# Patient Record
Sex: MALE | Race: BLACK OR AFRICAN AMERICAN | NOT HISPANIC OR LATINO | Employment: OTHER | URBAN - METROPOLITAN AREA
[De-identification: names, ages, dates, MRNs, and addresses within clinical notes are randomized per-mention and may not be internally consistent; named-entity substitution may affect disease eponyms.]

---

## 2017-01-03 ENCOUNTER — COMMUNICATION - HEALTHEAST (OUTPATIENT)
Dept: FAMILY MEDICINE | Facility: CLINIC | Age: 37
End: 2017-01-03

## 2017-01-27 ENCOUNTER — OFFICE VISIT - HEALTHEAST (OUTPATIENT)
Dept: FAMILY MEDICINE | Facility: CLINIC | Age: 37
End: 2017-01-27

## 2017-01-27 ENCOUNTER — HOSPITAL ENCOUNTER (OUTPATIENT)
Dept: CT IMAGING | Facility: HOSPITAL | Age: 37
Discharge: HOME | End: 2017-01-27

## 2017-01-27 ENCOUNTER — COMMUNICATION - HEALTHEAST (OUTPATIENT)
Dept: SCHEDULING | Facility: CLINIC | Age: 37
End: 2017-01-27

## 2017-01-27 DIAGNOSIS — K76.0 HEPATIC STEATOSIS: ICD-10-CM

## 2017-01-27 DIAGNOSIS — N20.0 KIDNEY STONE: ICD-10-CM

## 2017-01-27 DIAGNOSIS — R10.9 ACUTE RIGHT FLANK PAIN: ICD-10-CM

## 2017-02-16 ENCOUNTER — COMMUNICATION - HEALTHEAST (OUTPATIENT)
Dept: FAMILY MEDICINE | Facility: CLINIC | Age: 37
End: 2017-02-16

## 2017-02-16 DIAGNOSIS — E11.9 DM (DIABETES MELLITUS) (H): ICD-10-CM

## 2017-07-05 ENCOUNTER — OFFICE VISIT - HEALTHEAST (OUTPATIENT)
Dept: FAMILY MEDICINE | Facility: CLINIC | Age: 37
End: 2017-07-05

## 2017-07-05 DIAGNOSIS — L02.91 ABSCESS: ICD-10-CM

## 2017-07-05 RX ORDER — ALCOHOL ANTISEPTIC PADS
PADS, MEDICATED (EA) TOPICAL
Status: SHIPPED | COMMUNITY
Start: 2017-03-06 | End: 2022-12-01

## 2017-07-05 ASSESSMENT — MIFFLIN-ST. JEOR: SCORE: 1677.17

## 2018-04-02 ENCOUNTER — RECORDS - HEALTHEAST (OUTPATIENT)
Dept: ADMINISTRATIVE | Facility: OTHER | Age: 38
End: 2018-04-02

## 2018-04-06 ENCOUNTER — OFFICE VISIT - HEALTHEAST (OUTPATIENT)
Dept: FAMILY MEDICINE | Facility: CLINIC | Age: 38
End: 2018-04-06

## 2018-04-06 DIAGNOSIS — N52.9 ED (ERECTILE DYSFUNCTION): ICD-10-CM

## 2018-04-06 DIAGNOSIS — E11.9 TYPE 2 DIABETES MELLITUS WITHOUT COMPLICATION (H): ICD-10-CM

## 2018-04-06 DIAGNOSIS — R93.7 ABNORMAL MRI, LUMBAR SPINE: ICD-10-CM

## 2018-04-06 DIAGNOSIS — Z00.00 ROUTINE GENERAL MEDICAL EXAMINATION AT A HEALTH CARE FACILITY: ICD-10-CM

## 2018-04-06 DIAGNOSIS — I10 HYPERTENSION: ICD-10-CM

## 2018-04-06 LAB
ALBUMIN SERPL-MCNC: 3.7 G/DL (ref 3.5–5)
ALBUMIN UR-MCNC: NEGATIVE MG/DL
ALP SERPL-CCNC: 83 U/L (ref 45–120)
ALT SERPL W P-5'-P-CCNC: 41 U/L (ref 0–45)
ANION GAP SERPL CALCULATED.3IONS-SCNC: 9 MMOL/L (ref 5–18)
APPEARANCE UR: CLEAR
AST SERPL W P-5'-P-CCNC: 25 U/L (ref 0–40)
BASOPHILS # BLD AUTO: 0 THOU/UL (ref 0–0.2)
BASOPHILS NFR BLD AUTO: 1 % (ref 0–2)
BILIRUB SERPL-MCNC: 0.4 MG/DL (ref 0–1)
BILIRUB UR QL STRIP: NEGATIVE
BUN SERPL-MCNC: 9 MG/DL (ref 8–22)
CALCIUM SERPL-MCNC: 9.9 MG/DL (ref 8.5–10.5)
CHLORIDE BLD-SCNC: 104 MMOL/L (ref 98–107)
CHOLEST SERPL-MCNC: 173 MG/DL
CO2 SERPL-SCNC: 27 MMOL/L (ref 22–31)
COLOR UR AUTO: YELLOW
CREAT SERPL-MCNC: 0.76 MG/DL (ref 0.7–1.3)
EOSINOPHIL # BLD AUTO: 0.1 THOU/UL (ref 0–0.4)
EOSINOPHIL NFR BLD AUTO: 2 % (ref 0–6)
ERYTHROCYTE [DISTWIDTH] IN BLOOD BY AUTOMATED COUNT: 13 % (ref 11–14.5)
FASTING STATUS PATIENT QL REPORTED: YES
GFR SERPL CREATININE-BSD FRML MDRD: >60 ML/MIN/1.73M2
GLUCOSE BLD-MCNC: 236 MG/DL (ref 70–125)
GLUCOSE UR STRIP-MCNC: NEGATIVE MG/DL
HBA1C MFR BLD: 11.5 % (ref 3.5–6)
HCT VFR BLD AUTO: 42.7 % (ref 40–54)
HDLC SERPL-MCNC: 36 MG/DL
HGB BLD-MCNC: 14.4 G/DL (ref 14–18)
HGB UR QL STRIP: NEGATIVE
KETONES UR STRIP-MCNC: NEGATIVE MG/DL
LDLC SERPL CALC-MCNC: 114 MG/DL
LEUKOCYTE ESTERASE UR QL STRIP: NEGATIVE
LYMPHOCYTES # BLD AUTO: 2.7 THOU/UL (ref 0.8–4.4)
LYMPHOCYTES NFR BLD AUTO: 37 % (ref 20–40)
MCH RBC QN AUTO: 28.2 PG (ref 27–34)
MCHC RBC AUTO-ENTMCNC: 33.8 G/DL (ref 32–36)
MCV RBC AUTO: 83 FL (ref 80–100)
MONOCYTES # BLD AUTO: 0.6 THOU/UL (ref 0–0.9)
MONOCYTES NFR BLD AUTO: 8 % (ref 2–10)
NEUTROPHILS # BLD AUTO: 3.8 THOU/UL (ref 2–7.7)
NEUTROPHILS NFR BLD AUTO: 53 % (ref 50–70)
NITRATE UR QL: NEGATIVE
PH UR STRIP: 6 [PH] (ref 5–8)
PLATELET # BLD AUTO: 312 THOU/UL (ref 140–440)
PMV BLD AUTO: 7.4 FL (ref 7–10)
POTASSIUM BLD-SCNC: 4.5 MMOL/L (ref 3.5–5)
PROT SERPL-MCNC: 7.4 G/DL (ref 6–8)
RBC # BLD AUTO: 5.12 MILL/UL (ref 4.4–6.2)
SODIUM SERPL-SCNC: 140 MMOL/L (ref 136–145)
SP GR UR STRIP: 1.02 (ref 1–1.03)
TRIGL SERPL-MCNC: 116 MG/DL
TSH SERPL DL<=0.005 MIU/L-ACNC: 1.31 UIU/ML (ref 0.3–5)
UROBILINOGEN UR STRIP-ACNC: NORMAL
WBC: 7.2 THOU/UL (ref 4–11)

## 2018-04-06 ASSESSMENT — MIFFLIN-ST. JEOR: SCORE: 1657.89

## 2018-04-08 ENCOUNTER — COMMUNICATION - HEALTHEAST (OUTPATIENT)
Dept: FAMILY MEDICINE | Facility: CLINIC | Age: 38
End: 2018-04-08

## 2018-05-21 ENCOUNTER — OFFICE VISIT - HEALTHEAST (OUTPATIENT)
Dept: FAMILY MEDICINE | Facility: CLINIC | Age: 38
End: 2018-05-21

## 2018-05-21 DIAGNOSIS — I10 HYPERTENSION: ICD-10-CM

## 2018-05-21 DIAGNOSIS — N52.9 ED (ERECTILE DYSFUNCTION): ICD-10-CM

## 2018-05-21 ASSESSMENT — MIFFLIN-ST. JEOR: SCORE: 1671.5

## 2018-11-01 ENCOUNTER — OFFICE VISIT - HEALTHEAST (OUTPATIENT)
Dept: FAMILY MEDICINE | Facility: CLINIC | Age: 38
End: 2018-11-01

## 2018-11-01 DIAGNOSIS — R79.89 LOW TESTOSTERONE IN MALE: ICD-10-CM

## 2018-11-01 DIAGNOSIS — F52.0 DECREASED SEXUAL DESIRE: ICD-10-CM

## 2018-11-01 DIAGNOSIS — Z11.3 SCREEN FOR STD (SEXUALLY TRANSMITTED DISEASE): ICD-10-CM

## 2018-11-01 DIAGNOSIS — G44.209 TENSION HEADACHE: ICD-10-CM

## 2018-11-01 DIAGNOSIS — I10 ESSENTIAL HYPERTENSION: ICD-10-CM

## 2018-11-01 DIAGNOSIS — E11.9 TYPE 2 DIABETES MELLITUS WITHOUT COMPLICATION, WITHOUT LONG-TERM CURRENT USE OF INSULIN (H): ICD-10-CM

## 2018-11-01 DIAGNOSIS — E55.9 VITAMIN D DEFICIENCY: ICD-10-CM

## 2018-11-01 LAB
ANION GAP SERPL CALCULATED.3IONS-SCNC: 13 MMOL/L (ref 5–18)
BUN SERPL-MCNC: 12 MG/DL (ref 8–22)
CALCIUM SERPL-MCNC: 9.6 MG/DL (ref 8.5–10.5)
CHLORIDE BLD-SCNC: 104 MMOL/L (ref 98–107)
CO2 SERPL-SCNC: 22 MMOL/L (ref 22–31)
CREAT SERPL-MCNC: 0.6 MG/DL (ref 0.7–1.3)
GFR SERPL CREATININE-BSD FRML MDRD: >60 ML/MIN/1.73M2
GLUCOSE BLD-MCNC: 72 MG/DL (ref 70–125)
HBA1C MFR BLD: 7.5 % (ref 3.5–6)
HIV 1+2 AB+HIV1 P24 AG SERPL QL IA: NEGATIVE
POTASSIUM BLD-SCNC: 4.1 MMOL/L (ref 3.5–5)
SODIUM SERPL-SCNC: 139 MMOL/L (ref 136–145)

## 2018-11-02 LAB
25(OH)D3 SERPL-MCNC: 19.6 NG/ML (ref 30–80)
T PALLIDUM AB SER QL: NEGATIVE

## 2018-11-05 ENCOUNTER — COMMUNICATION - HEALTHEAST (OUTPATIENT)
Dept: FAMILY MEDICINE | Facility: CLINIC | Age: 38
End: 2018-11-05

## 2018-11-07 ENCOUNTER — COMMUNICATION - HEALTHEAST (OUTPATIENT)
Dept: FAMILY MEDICINE | Facility: CLINIC | Age: 38
End: 2018-11-07

## 2018-11-07 LAB
SHBG SERPL-SCNC: 13 NMOL/L (ref 11–80)
TESTOST FREE SERPL-MCNC: 6.05 NG/DL (ref 4.7–24.4)
TESTOST SERPL-MCNC: 207 NG/DL (ref 240–950)

## 2018-11-08 ENCOUNTER — COMMUNICATION - HEALTHEAST (OUTPATIENT)
Dept: FAMILY MEDICINE | Facility: CLINIC | Age: 38
End: 2018-11-08

## 2018-12-26 ENCOUNTER — AMBULATORY - HEALTHEAST (OUTPATIENT)
Dept: LAB | Facility: HOSPITAL | Age: 38
End: 2018-12-26

## 2018-12-26 DIAGNOSIS — Z31.41 FERTILITY TESTING: ICD-10-CM

## 2018-12-27 LAB
ANNOTATION COMMENT IMP: NORMAL
CHARACTER SMN: NORMAL
IMM GERM CELLS # SMN: 1.56 X10(6)
PATIENT LOCATION: NORMAL
PH SMN: 8 [PH]
SEX ABSTIN DURATION TIME PATIENT: 6 D
SMN ANALYSIS METHOD: NORMAL
SPEC CONTAINER SPEC: NORMAL
SPECIMEN VOL SMN: 2.5 ML
SPERM # SMN: 39.1 X10(6)
SPERM ABNORM HEAD SHAPE NFR SMN: 19.5 %
SPERM ABNORM HEAD SIZE NFR SMN: 0 %
SPERM ABNORM MIDPIECE NFR SMN: 8.5 %
SPERM ABNORM TAIL NFR SMN: 39.5 %
SPERM ACROSOME DEFECTS NFR SMN: 21.5 %
SPERM AGGLUTINATED SMN QL: 4
SPERM DOUBLE FORMS NFR SMN: 6 %
SPERM MOTILE # SMN: 16.4 X10(6)
SPERM MOTILE NFR SMN: 41 X10(6)
SPERM MOTILE NFR SMN: 42 %
SPERM MOTILE SMN QL MICRO: 2.5
SPERM MULTIPLE DEFECTS NFR SMN: 0 %
SPERM NORM NFR SMN: 5 %
VISC SMN QL: 4
WBC # SMN: 0 X10(6)

## 2019-02-25 ENCOUNTER — RECORDS - HEALTHEAST (OUTPATIENT)
Dept: ADMINISTRATIVE | Facility: OTHER | Age: 39
End: 2019-02-25

## 2019-03-14 ENCOUNTER — RECORDS - HEALTHEAST (OUTPATIENT)
Dept: HEALTH INFORMATION MANAGEMENT | Facility: CLINIC | Age: 39
End: 2019-03-14

## 2019-05-15 ENCOUNTER — OFFICE VISIT - HEALTHEAST (OUTPATIENT)
Dept: FAMILY MEDICINE | Facility: CLINIC | Age: 39
End: 2019-05-15

## 2019-05-15 DIAGNOSIS — I10 HYPERTENSION: ICD-10-CM

## 2019-05-15 DIAGNOSIS — E11.9 TYPE 2 DIABETES MELLITUS WITHOUT COMPLICATION (H): ICD-10-CM

## 2019-05-15 DIAGNOSIS — G44.219 EPISODIC TENSION-TYPE HEADACHE, NOT INTRACTABLE: ICD-10-CM

## 2019-05-15 LAB
CREAT UR-MCNC: 196.6 MG/DL
HBA1C MFR BLD: 8.2 % (ref 3.5–6)
MICROALBUMIN UR-MCNC: 0.79 MG/DL (ref 0–1.99)
MICROALBUMIN/CREAT UR: 4 MG/G

## 2019-07-16 ENCOUNTER — OFFICE VISIT - HEALTHEAST (OUTPATIENT)
Dept: FAMILY MEDICINE | Facility: CLINIC | Age: 39
End: 2019-07-16

## 2019-07-16 DIAGNOSIS — E11.9 TYPE 2 DIABETES MELLITUS WITHOUT COMPLICATION (H): ICD-10-CM

## 2019-07-16 DIAGNOSIS — I10 HYPERTENSION: ICD-10-CM

## 2019-07-16 RX ORDER — EMPAGLIFLOZIN 10 MG/1
1 TABLET, FILM COATED ORAL DAILY
Qty: 90 TABLET | Refills: 3 | Status: SHIPPED | OUTPATIENT
Start: 2019-07-16 | End: 2021-10-25

## 2019-07-16 RX ORDER — METFORMIN HCL 500 MG
1000 TABLET, EXTENDED RELEASE 24 HR ORAL DAILY
Qty: 180 TABLET | Refills: 3 | Status: SHIPPED | OUTPATIENT
Start: 2019-07-16 | End: 2021-10-25

## 2019-07-16 RX ORDER — ATORVASTATIN CALCIUM 20 MG/1
20 TABLET, FILM COATED ORAL DAILY
Qty: 90 TABLET | Refills: 3 | Status: SHIPPED | OUTPATIENT
Start: 2019-07-16 | End: 2021-10-25

## 2019-07-16 RX ORDER — LISINOPRIL 20 MG/1
20 TABLET ORAL DAILY
Qty: 90 TABLET | Refills: 3 | Status: SHIPPED | OUTPATIENT
Start: 2019-07-16 | End: 2022-11-21

## 2019-07-16 RX ORDER — GLYBURIDE 5 MG/1
TABLET ORAL
Qty: 180 TABLET | Refills: 3 | Status: SHIPPED | OUTPATIENT
Start: 2019-07-16 | End: 2021-10-25

## 2021-05-28 NOTE — PROGRESS NOTES
Assessment:     1. Episodic tension-type headache, not intractable     2. Hypertension  atorvastatin (LIPITOR) 20 MG tablet    lisinopril (PRINIVIL,ZESTRIL) 20 MG tablet   3. Type 2 diabetes mellitus without complication (H)  JARDIANCE 10 mg Tab    metFORMIN (GLUCOPHAGE-XR) 500 MG 24 hr tablet    glyBURIDE (DIABETA) 5 MG tablet    Glycosylated Hemoglobin A1c    Microalbumin, Random Urine       Plan:     1. Hypertension  Patient's blood pressure is under good control; we had a long discussion about an exercise program which the patient really does need he promises to keep an exercise diary for me for 2 months return at that time and will reevaluate and also recheck his blood parameters  - atorvastatin (LIPITOR) 20 MG tablet; Take 1 tablet (20 mg total) by mouth daily.  Dispense: 30 tablet; Refill: 11  - lisinopril (PRINIVIL,ZESTRIL) 20 MG tablet; Take 1 tablet (20 mg total) by mouth daily.  Dispense: 30 tablet; Refill: 11    2. Type 2 diabetes mellitus without complication (H)  Patient does not exercise at all does not walk; we advised him he needs to walk 30 to 40 minutes at least 5 times per week we outlined a plan he is going to keep a diary and come in and see me here in 2 months again it is summertime and there is no excuse for him not to come home he has the time [place] an area [place] very walk [place] and I would like to see him lose 10 to 15 pounds so we may consider cutting back on some of his medications  - JARDIANCE 10 mg Tab; Take 1 tablet (10 mg total) by mouth daily.  Dispense: 30 tablet; Refill: 11  - metFORMIN (GLUCOPHAGE-XR) 500 MG 24 hr tablet; Take 2 tablets (1,000 mg total) by mouth daily.  Dispense: 60 tablet; Refill: 11  - glyBURIDE (DIABETA) 5 MG tablet; TAKE 1 TABLET (5 MG TOTAL) BY MOUTH 2 (TWO) TIMES A DAY WITH MEALS.  Dispense: 60 tablet; Refill: 11  - Glycosylated Hemoglobin A1c  - Microalbumin, Random Urine    3. Episodic tension-type headache, not intractable  Patient's headache  pattern is that of stress related type headaches.  He works hard he and his wife are trying to get pregnant again.  We did allow him to ventilate he really needs aerobic exercise and he can bring his wife along with him as I feel she is under a lot of stress also.  These will make his blood sugars better his weight better his overall pill load may be able to be cut down if we can get this program going.  We did spend a considerable amount of time at today's visit discussing health hygiene.      Subjective:   I am happy to see this pleasant 39-year-old again who comes in for his diabetes and hyper tension check and medication check.  Unfortunately he is suffering again from headaches which he says are on the top of his head and burn and also some left hip and leg discomfort.  He has no constitutional complaints vision is okay no cranial nerve dysfunction shortness of breath dyspnea chest pain hemoptysis no change in bowels no signs of gastroparesis no neurological complaints no paresthesias.  Please see the above outlined in his plan patient desperately needs exercise program to go on 52 weeks a year to have an overall goal here of getting weight off and cut down on pill load for long-term good health.  Patient agrees with this.  We are not to change any of his medications today unless his blood parameters demand that once we get them back his overall situation to me shows a fair amount of stress job-related as well as some fertility issues which we have discussed at other times.  Hopefully he will keep this diarrhea that we have suggested will come back in 2 months we will see how he is doing I want to reweigh him and go over all of his tests again we did    Review of Systems: A complete 14 point review of systems was obtained and is negative or as stated in the history of present illness.    Past Medical History:   Diagnosis Date     Diabetes mellitus (H)      Essential hypertension 11/1/2018     Family History    Problem Relation Age of Onset     Heart attack Father      No past surgical history on file.  Social History     Tobacco Use     Smoking status: Never Smoker     Smokeless tobacco: Never Used   Substance Use Topics     Alcohol use: No     Drug use: No         Objective:   /70   Pulse 80   Wt 182 lb 14.4 oz (83 kg)   BMI 30.44 kg/m      General Appearance:  Alert, cooperative, no distress  Head:  Normocephalic, no obvious abnormality  Ears: TM anatomy normal  Eyes:  PERRL, EOM's intact, conjunctiva and corneas clear  Nose:  Nares symmetrical, septum midline, mucosa pink, no sinus tenderness  Throat:  Lips, tongue, and mucosa are moist, pink, and intact  Neck:  Supple, symmetrical, trachea midline, no adenopathy; thyroid: no enlargement, symmetric,no tenderness/mass/nodules; no carotid bruit, no JVD  Back:  Symmetrical, no curvature, ROM normal, no CVA tenderness  Chest/Breast:  No mass or tenderness  Lungs:  Clear to auscultation bilaterally, respirations unlabored   Heart:  Normal PMI, regular rate & rhythm, S1 and S2 normal, no murmurs, rubs, or gallops  Abdomen:  Soft, non-tender, bowel sounds active all four quadrants, no mass, or organomegaly  Musculoskeletal:  Tone and strength strong and symmetrical, all extremities  Lymphatic:  No adenopathy  Skin/Hair/Nails:  Skin warm, dry, and intact, no rashes  Neurologic:  Alert and oriented x3, no cranial nerve deficits, normal strength and tone, gait steady  Extremities:  No edema.  Abiel's sign negative.    Genitourinary: deferred  Pulses:  Equal bilaterally     The following high BMI interventions were performed this visit: encouragement to exercise      This note has been dictated using voice recognition software. Any grammatical or context distortions are unintentional and inherent to the the software.

## 2021-05-30 VITALS — BODY MASS INDEX: 32.67 KG/M2 | WEIGHT: 193.3 LBS

## 2021-05-30 NOTE — PROGRESS NOTES
Assessment:     1. Hypertension  atorvastatin (LIPITOR) 20 MG tablet    lisinopril (PRINIVIL,ZESTRIL) 20 MG tablet   2. Type 2 diabetes mellitus without complication (H)  glyBURIDE (DIABETA) 5 MG tablet    JARDIANCE 10 mg Tab    metFORMIN (GLUCOPHAGE-XR) 500 MG 24 hr tablet       Plan:     1. Hypertension  No change in medications; patient is losing his insurance; plans on traveling to [place] where he may obtain tablets we discussed this hopefully he can get to ensure some other form of public assistance we will see him for follow-up within 6 months to repeat his A1c and his urine microalbumin  - atorvastatin (LIPITOR) 20 MG tablet; Take 1 tablet (20 mg total) by mouth daily.  Dispense: 90 tablet; Refill: 3  - lisinopril (PRINIVIL,ZESTRIL) 20 MG tablet; Take 1 tablet (20 mg total) by mouth daily.  Dispense: 90 tablet; Refill: 3    2. Type 2 diabetes mellitus without complication (H)  No change in medication hopefully you can obtain the following tablets  - glyBURIDE (DIABETA) 5 MG tablet; TAKE 1 TABLET (5 MG TOTAL) BY MOUTH 2 (TWO) TIMES A DAY WITH MEALS.  Dispense: 180 tablet; Refill: 3  - JARDIANCE 10 mg Tab; Take 1 tablet (10 mg total) by mouth daily.  Dispense: 90 tablet; Refill: 3  - metFORMIN (GLUCOPHAGE-XR) 500 MG 24 hr tablet; Take 2 tablets (1,000 mg total) by mouth daily.  Dispense: 180 tablet; Refill: 3      Subjective:   I am seeing this patient for follow-up.  Unfortunately he is losing his insurance coverage.  His last A1c was 8.2.  We did increase his aerobic exercise discussed the importance of walking and losing some weight.  He is struggling with this.  Unfortunately received news his mother is very ill and may have to go back to [place].  May have to take some time off from his job.'s a vicious cycle for this young man.  He states he may be able to obtain his tablets for his diabetes and hypertension while in [place] specifically [place] and we wish him well.  He is looking at other forms of  healthcare coverage perhaps men sure.  We wish him well his exam today is unchanged he seems to be under some stress and I certainly can understand that hopefully things will resolve for him.  He denies any visual changes hearing loss dysphasia shortness of breath dyspnea chest pain angina abdominal pain diarrhea constipation urgency frequency dysuria follow-up as outlined.    Review of Systems: A complete 14 point review of systems was obtained and is negative or as stated in the history of present illness.    Past Medical History:   Diagnosis Date     Diabetes mellitus (H)      Essential hypertension 11/1/2018     Family History   Problem Relation Age of Onset     Heart attack Father      No past surgical history on file.  Social History     Tobacco Use     Smoking status: Never Smoker     Smokeless tobacco: Never Used   Substance Use Topics     Alcohol use: No     Drug use: No         Objective:   /70   Pulse 80   Wt 184 lb 8 oz (83.7 kg)   BMI 30.70 kg/m      General Appearance:  Alert, cooperative, no distress  Head:  Normocephalic, no obvious abnormality  Ears: TM anatomy normal  Eyes:  PERRL, EOM's intact, conjunctiva and corneas clear  Nose:  Nares symmetrical, septum midline, mucosa pink, no sinus tenderness  Throat:  Lips, tongue, and mucosa are moist, pink, and intact  Neck:  Supple, symmetrical, trachea midline, no adenopathy; thyroid: no enlargement, symmetric,no tenderness/mass/nodules; no carotid bruit, no JVD  Back:  Symmetrical, no curvature, ROM normal, no CVA tenderness  Chest/Breast:  No mass or tenderness  Lungs:  Clear to auscultation bilaterally, respirations unlabored   Heart:  Normal PMI, regular rate & rhythm, S1 and S2 normal, no murmurs, rubs, or gallops  Abdomen:  Soft, non-tender, bowel sounds active all four quadrants, no mass, or organomegaly  Musculoskeletal:  Tone and strength strong and symmetrical, all extremities  Lymphatic:  No adenopathy  Skin/Hair/Nails:  Skin warm,  dry, and intact, no rashes  Neurologic:  Alert and oriented x3, no cranial nerve deficits, normal strength and tone, gait steady  Extremities:  No edema.  Abiel's sign negative.    Genitourinary: deferred  Pulses:  Equal bilaterally     The following high BMI interventions were performed this visit: encouragement to exercise      This note has been dictated using voice recognition software. Any grammatical or context distortions are unintentional and inherent to the the software.

## 2021-05-31 VITALS — HEIGHT: 65 IN | BODY MASS INDEX: 31.16 KG/M2 | WEIGHT: 187 LBS

## 2021-06-01 VITALS — HEIGHT: 65 IN | WEIGHT: 181 LBS | BODY MASS INDEX: 30.16 KG/M2

## 2021-06-01 VITALS — BODY MASS INDEX: 32.95 KG/M2 | WEIGHT: 195 LBS | HEIGHT: 65 IN | BODY MASS INDEX: 32.45 KG/M2

## 2021-06-01 VITALS — BODY MASS INDEX: 30.66 KG/M2 | HEIGHT: 65 IN | WEIGHT: 184 LBS

## 2021-06-02 VITALS — WEIGHT: 177.06 LBS | BODY MASS INDEX: 29.46 KG/M2

## 2021-06-03 VITALS — WEIGHT: 184.5 LBS | BODY MASS INDEX: 30.7 KG/M2

## 2021-06-03 VITALS — BODY MASS INDEX: 30.44 KG/M2 | WEIGHT: 182.9 LBS

## 2021-06-08 NOTE — PROGRESS NOTES
Walk-In Clinic Note    SUBJECTIVE:   Heidi Boyce is a 37 y.o. male who presents today for evaluation of back pain. He had acute onset of right flank pain at noon today while at work eating lunch.  He notes the pain is squeezing in nature and calms and goes.  He was in the lobby of the waiting room on his knees and pain, but now states he feels better.  He denies any blood in his urine or pain with urination.  He had no injury.  He has not tried any medications to help his pain thus far.  She has no symptoms on the left side of his back, nor down his legs.  He has never had a kidney stone before, though his bath had one 1 week ago and was concerned that was what was going on so told him to come in to be evaluated.  He denies any fevers.  No nausea or vomiting.  No diarrhea.    Patient Active Problem List   Diagnosis     Obesity     Vitamin D Deficiency     Abdominal Pain     Type 2 diabetes mellitus without complication     Abnormal MRI, lumbar spine       History   Smoking Status     Never Smoker   Smokeless Tobacco     Never Used       Current Medications:  Current Outpatient Prescriptions on File Prior to Visit   Medication Sig Dispense Refill     glyBURIDE (DIABETA) 5 MG tablet TAKE 1 TABLET (5 MG TOTAL) BY MOUTH 2 (TWO) TIMES A DAY WITH MEALS. 180 tablet 3     metFORMIN (GLUCOPHAGE-XR) 500 MG 24 hr tablet TAKE TWO TABLETS BY MOUTH DAILY 180 tablet 3     No current facility-administered medications on file prior to visit.        Allergies:   No Known Allergies    OBJECTIVE:   Vitals:    01/27/17 1617   BP: 130/70   Pulse: 80   Resp: 15   Temp: 97.7  F (36.5  C)   TempSrc: Oral   SpO2: 99%   Weight: 193 lb 4.8 oz (87.7 kg)      General: Alert and in no acute distress.  Appears to be uncomfortable.    Pulmonary: Clear to ausculation throughout with good air movement, no crackles or wheezing.  Cardiac: Regular rate and rhythm, no murmur  Abdomen: Active bowel sounds. Soft, non-tender. No peritoneal signs.  Right  sided CVA tenderness, no pain with palpation of low back or paraspinal muscles.   Musculoskeletal: No deformities. Ambulatory with movement of all extremities.  Skin: Color is normal. No rashes or abnormal lesions.    ASSESSMENT AND PLAN:   1. Acute right flank pain  Urinalysis-UC if Indicated     Concerning for kidney stones base on history and physical exam findings. No symptoms to suggest infection. UA obtained with blood. Patient sent to [facility] for non-contrast CT. Received call from the radiologist, patient does have a 2 mm stone at the right UVJ junction.  Incidental finding of severe hepatic stenosis also noted.  Findings were discussed with patient over the phone.  He was discharged home with ibuprofen 400 mg to use every 6 hours, Dramamine 50 mg to use nightly as well as every 6 hours for pain, as well as Norco to use for severe breakthrough pain.  We reviewed that if he has severe uncontrolled pain, vomiting, or fevers over the weekend, he should be seen immediately in the emergency room.  I did recommend that he strain his urine.  Rate of passage is high for the size stone.  He will follow-up with his primary care provider next week.    Sandy Ghosh MD

## 2021-06-11 NOTE — PROGRESS NOTES
Assessment/ Plan  1.  Skin abscess  Right hip, recurrent  Suspect underlying sebaceous cyst or other underlying structural abnormality    Patient had allergic reaction with amoxicillin.  Will use trimethoprim sulfa for 10 days.  Recommend follow-up with primary care doctor or myself in the future and consideration of excision of underlying structural abnormality/sebaceous cyst  - Culture, Wound    Body mass index is 31.6 kg/(m^2).    Subjective  CC:  Chief Complaint   Patient presents with     Skin Problem     right side of hip     HPI:  2-3 days of pain and drainage right hip.  Sudden onset.  Patient claims this is the third recurrence in the same spot, happens every 2 or 3 years  Moderate pain, significant drainage.  No fever or chills and denies any increase in his blood sugars with this infection.    States that there is a hard lump underneath the skin between these episodes.      PFSH:  Current medications reviewed as follows:  Current Outpatient Prescriptions on File Prior to Visit   Medication Sig     glyBURIDE (DIABETA) 5 MG tablet TAKE 1 TABLET (5 MG TOTAL) BY MOUTH 2 (TWO) TIMES A DAY WITH MEALS.     metFORMIN (GLUCOPHAGE-XR) 500 MG 24 hr tablet TAKE TWO TABLETS BY MOUTH DAILY     ibuprofen (ADVIL,MOTRIN) 400 MG tablet Take 1 tablet (400 mg total) by mouth every 6 (six) hours.     No current facility-administered medications on file prior to visit.      Patient Active Problem List    Diagnosis Date Noted     Abnormal MRI, lumbar spine 11/12/2015     Obesity      Overview Note:     Created by Conversion         Vitamin D Deficiency      Overview Note:     Created by Conversion    Replacement Utility updated for latest IMO load       Abdominal Pain      Overview Note:     Created by Conversion    Replacement Utility updated for latest IMO load       Type 2 diabetes mellitus without complication      Overview Note:     Created by Conversion         History   Smoking Status     Never Smoker   Smokeless Tobacco  "    Never Used     Social History     Social History Narrative     Patient Care Team:  Krishna Jasso MD as PCP - General (Family Medicine)  ROS  Negative constitutional.  Positive respiratory for slight cough which followed URI 3 weeks ago.  This is gradually getting better.   Negative skin  Objective  Physical Exam  Vitals:    07/05/17 0811   BP: 118/72   Pulse: 86   Resp: 20   Temp: 98  F (36.7  C)   TempSrc: Oral   SpO2: 98%   Weight: 187 lb (84.8 kg)   Height: 5' 4.5\" (1.638 m)     Overweight 37-year-old male, no distress.  Dressing right hip with serosanguinous discharge starting to soak through.  Ulcer with raised edges, minimal redness and copious discharge, very thin and watery.  This was cultured.  No surrounding induration.  Wound gently squeezed and only small amount of extra discharge found so I did not feel this warranted exploration  Diagnostics  None    Please note: Voice recognition software was used in this dictation.  It may therefore contain typographical errors.    "

## 2021-06-16 PROBLEM — E11.69 ERECTILE DYSFUNCTION ASSOCIATED WITH TYPE 2 DIABETES MELLITUS (H): Status: ACTIVE | Noted: 2017-03-06

## 2021-06-16 PROBLEM — I10 ESSENTIAL HYPERTENSION: Status: ACTIVE | Noted: 2018-11-01

## 2021-06-16 PROBLEM — N52.1 ERECTILE DYSFUNCTION ASSOCIATED WITH TYPE 2 DIABETES MELLITUS (H): Status: ACTIVE | Noted: 2017-03-06

## 2021-06-16 PROBLEM — E29.1 HYPOGONADISM IN MALE: Status: ACTIVE | Noted: 2017-03-08

## 2021-06-16 PROBLEM — K76.0 FATTY LIVER: Status: ACTIVE | Noted: 2017-03-06

## 2021-06-16 PROBLEM — N20.0 KIDNEY STONE: Status: ACTIVE | Noted: 2018-11-01

## 2021-06-17 NOTE — PROGRESS NOTES
"CC: I am having trouble with my erections; my blood pressure is up; I have headaches    HPI: Patient is under my care for a few years now patient has been having trouble with his diabetes blood sugars have been elevated he follows with Dr. Foster of [facility].  Patient is on metformin and glyburide thousand milligrams twice daily glyburide 5 mg daily patient has noticed she has had some headaches and some slight chest twinges in the last year or so but no shortness of breath dyspnea or abdominal pain is having difficulty getting an erection in the last 3-4 weeks.  Patient is also interested in getting his wife pregnant again she has a problem with blocked tubes he is requesting referral for infertility which we will investigate form.  Patient does not exercise on a regular basis we stressed the importance of regular aerobic exercise.  Patient's main problem is overeating he does not drink alcohol or smoke or use drugs and never has.  Patient's blood pressure is been found to be high at other medical settings in the last year or so.  Today patient's blood pressure elevated medical decision-making is to start the patient on a blood pressure medication today in follow-up in 1 month's time all medical questions that were asked were answered I personally reviewed family and social history surgeries allergies and problems list.      Review of Systems: A complete 14 point review of systems was obtained and is negative or as stated in the history of present illness.    Past Medical History:   Diagnosis Date     Diabetes mellitus      Family History   Problem Relation Age of Onset     Heart attack Father      No past surgical history on file.  Social History   Substance Use Topics     Smoking status: Never Smoker     Smokeless tobacco: Never Used     Alcohol use No       PE:   BP (!) 154/98  Pulse 74  Ht 5' 5\" (1.651 m)  Wt 181 lb (82.1 kg)  SpO2 99%  BMI 30.12 kg/m2     General Appearance:  Alert, cooperative, " no distress; overweight body habitus  Head:  Normocephalic, no obvious abnormality  Ears: TM anatomy normal  Eyes:  PERRL, EOM's intact, conjunctiva and corneas clear  Nose:  Nares symmetrical, septum midline, mucosa pink, no sinus tenderness  Throat:  Lips, tongue, and mucosa are moist, pink, and intact  Neck:  Supple, symmetrical, trachea midline, no adenopathy; thyroid: no enlargement, symmetric,no tenderness/mass/nodules; no carotid bruit, no JVD  Back:  Symmetrical, no curvature, ROM normal, no CVA tenderness  Chest/Breast:  No mass or tenderness  Lungs:  Clear to auscultation bilaterally, respirations unlabored   Heart:  Normal PMI, regular rate & rhythm, S1 and S2 normal, no murmurs, rubs, or gallops  Abdomen:  Soft, non-tender, bowel sounds active all four quadrants, no mass, or organomegaly  Musculoskeletal:  Tone and strength strong and symmetrical, all extremities  Lymphatic:  No adenopathy  Skin/Hair/Nails:  Skin warm, dry, and intact, no rashes  Neurologic:  Alert and oriented x3, no cranial nerve deficits, normal strength and tone, gait steady  Extremities:  No edema.  Abiel's sign negative.    Genitourinary: Circumcised testes down prostate firm but normal  Pulses:  Equal bilaterally    Impression:     1. Routine general medical examination at a health care facility  Comprehensive Metabolic Panel    Urinalysis-UC if Indicated    HM1(CBC and Differential)    HM1 (CBC with Diff)   2. Type 2 diabetes mellitus without complication  Comprehensive Metabolic Panel    Glycosylated Hemoglobin A1c    Lipid Cascade    Thyroid [place]    metFORMIN (GLUCOPHAGE-XR) 500 MG 24 hr tablet    glyBURIDE (DIABETA) 5 MG tablet   3. Abnormal MRI, lumbar spine     4. ED (erectile dysfunction)     5. Hypertension  lisinopril (PRINIVIL,ZESTRIL) 20 MG tablet        PLAN:   1. Routine general medical examination at a health care facility  Workup to include  - Comprehensive Metabolic Panel  - Urinalysis-UC if Indicated  - HM1(CBC  and Differential)  - HM1 (CBC with Diff)    2. Type 2 diabetes mellitus without complication  Workup to include  - Comprehensive Metabolic Panel  - Glycosylated Hemoglobin A1c  - Lipid Cascade  - Thyroid Cascade  - metFORMIN (GLUCOPHAGE-XR) 500 MG 24 hr tablet; Take 2 tablets (1,000 mg total) by mouth daily.  Dispense: 180 tablet; Refill: 3  - glyBURIDE (DIABETA) 5 MG tablet; TAKE 1 TABLET (5 MG TOTAL) BY MOUTH 2 (TWO) TIMES A DAY WITH MEALS.  Dispense: 180 tablet; Refill: 3    3. Abnormal MRI, lumbar spine  No further back issues    4. ED (erectile dysfunction)  We may consider Viagra type medication after blood pressure control    5. Hypertension  - lisinopril (PRINIVIL,ZESTRIL) 20 MG tablet; Take 1 tablet (20 mg total) by mouth daily.  Dispense: 30 tablet; Refill: 11      The following high BMI interventions were performed this visit: weight monitoring        This note has been dictated using voice recognition software. Any grammatical or context distortions are unintentional and inherent to the the software.

## 2021-06-21 NOTE — PROGRESS NOTES
Assessment/Plan:    1. Essential hypertension  Patient has a history of hypertension, currently taking lisinopril 20 mg tablets.  Pressures well controlled at this time.  He reports no side effects from medication.  Due for BMP, ordered today.  - Basic Metabolic Panel    2. Type 2 diabetes mellitus without complication, without long-term current use of insulin (H)  History of diabetes; last A1c was 9.2% at endocrinology office.  Patient is due for recheck at this time, ordered today.  Discussed with patient that he is overdue for endocrinology visit and that he should schedule this at his convenience.  - Glycosylated Hemoglobin A1c    3. Vitamin D deficiency  History of low vitamin D level, level was 18.1 on 10/24/16.  Reasonable to repeat at this time and replace/supplement as needed.  - Vitamin D, Total (25-Hydroxy)    4. Low testosterone in male  5. Decreased sexual desire  Patient with a history of decreased free and total testosterone in 2013.  He reports using testosterone for a short time but then self discontinuing.  Patient has been having issues especially over the past few months in regards to sexual drive.  Will recheck testosterone level today.  Also requesting semen analysis, though I am somewhat hesitant to order this again today without knowing patient's full story and his report that this is been done previously though I am unable to find these records.  Will discuss with patient's PCP and will order semen analysis if appropriate.  - Testosterone, Free and Total    6. Screen for STD (sexually transmitted disease)  Patient requests blood testing for STDs today, will check syphilis and HIV at this time  - Syphilis Screen, [place]  - HIV Antigen/Antibody Screening [place]    7.  Headache  Likely tension headache.  Patient was concerned that this could be related to his hair plugs that were put in 1.5 years ago; no abnormalities of scalp noted today and timeline would be unusual therefore discussed hair  plugs are unlikely to be the cause of his headache.  No red flag signs at this time, or need for imaging.  Discussed supportive cares including: Adequate hydration, Tylenol or ibuprofen as needed, stable caffeine intake and good sleep.      Follow up: As needed for persistent headache; discussed the patient is due for endocrinology visit    Augusta Bowman MD  [facility]    Subjective:    Patient ID: Heidi Boyce is a 38 y.o. male is here today for BP check    HTN  -hx HTN, on lisinopril 20mg daily  -/70 today  -Patient has been having headache as below was concerned that elevated blood pressure was the cause of this  -No chest pain, shortness of breath or lower extremity edema  -Reports no issues with taking medication, no side effects reported    Headache:  -occasional headache/discomfort - started last week  -did get hair plugs last year and wonders if that is causing the headache - done in [place]  -Headache is located around area of hair plugs and a little in the forehead  -hasn't tried medications or other therapies for this  -No vision changes  -No neck pain or nausea/vomiting  -Nothing seems to make the headache worse, no clear trigger for headache    DM2  -following with [facility], last visit was 6/27/18 - due for visit (was supposed to return in 3 months)  -pt is currently taking glyburide, metformin and jardiance  -A1c was 11.5% on 4/6/18 and 9.2% on 6/27/18  -Patient shares that his blood sugars have been good at home  -Denies polydipsia or polyuria  -Patient reports no issues with current medications, no side effects reported    Decreased sexual drive, requesting semen analysis  -Patient reports she has had issues with decreased sexual drive for many months  -Also notes decreased strength of erections but is still able to obtain erections  -had been on testosterone in the past - was on it for 1 week but it didn't seem to help so pt stopped it  -Patient has hx low  testosterone  -Patient shares that him and his partner are attempting to get pregnant  -Patient shares that his partner has a blocked fallopian tube  -He is interested in having a semen analysis done at this time, though he does report that it has been done in the past -I am unable to find documentation of this or prior result report      Patient Active Problem List   Diagnosis     Obesity     Vitamin D Deficiency     Abdominal Pain     Type 2 diabetes mellitus without complication (H)     Abnormal MRI, lumbar spine     Essential hypertension     Past Medical History:   Diagnosis Date     Diabetes mellitus (H)      Essential hypertension 11/1/2018     No past surgical history on file.    Current Outpatient Prescriptions on File Prior to Visit   Medication Sig Dispense Refill     glyBURIDE (DIABETA) 5 MG tablet TAKE 1 TABLET (5 MG TOTAL) BY MOUTH 2 (TWO) TIMES A DAY WITH MEALS. 180 tablet 3     lancets 25 gauge Misc As directed. Dispense item covered by pt ins. E11.65 NIDDM type II, uncontrolled - Test 4 times/day. Reason: High A1C       lisinopril (PRINIVIL,ZESTRIL) 20 MG tablet Take 1 tablet (20 mg total) by mouth daily. 30 tablet 11     [DISCONTINUED] ANDROGEL 1 % (25 mg/2.5gram) GlPk APPLY TO CLEAN DRY INTACT SKIN OF SHOULDER UPPER ARMS OR ABDOMEN DO NOT APPLY TO GENITALS  1     [DISCONTINUED] blood glucose meter (GLUCOMETER) Dispense meter, test strips, lancets covered by pt ins. E11.65 NIDDM type II, uncontrolled - Test 4 times/day. Reason: High A1C       [DISCONTINUED] glyBURIDE (DIABETA) 5 MG tablet Take 5 mg by mouth.       [DISCONTINUED] metFORMIN (GLUCOPHAGE-XR) 500 MG 24 hr tablet Take 2 tablets (1,000 mg total) by mouth daily. 180 tablet 3     [DISCONTINUED] sildenafil, antihypertensive, (REVATIO) 20 mg tablet Take 1 tablet (20 mg total) by mouth 3 (three) times a day. 60 tablet 0     [DISCONTINUED] sulfamethoxazole-trimethoprim (SEPTRA DS) 800-160 mg per tablet Take 1 tablet by mouth 2 (two) times a  day. 20 tablet 0     [DISCONTINUED] testosterone (ANDRODERM) 4 mg/24 hr PT24 patch Apply patch daily to clean skin on back, abdomen, arms, thighs. Not to genitals. Remove old patch before applying new one.       No current facility-administered medications on file prior to visit.      Allergies   Allergen Reactions     Amoxicillin Itching     From legs     Social History     Social History     Marital status:      Spouse name: N/A     Number of children: 2     Years of education: N/A     Occupational History      [employer]     Social History Main Topics     Smoking status: Never Smoker     Smokeless tobacco: Never Used     Alcohol use No     Drug use: No     Sexual activity: No     Other Topics Concern     Not on file     Social History Narrative     Review of systems is as stated in HPI, and the remainder of system review is otherwise negative.    Objective:      /70  Pulse 80  Wt 177 lb 1 oz (80.3 kg)  BMI 29.46 kg/m2    General appearance: awake, NAD  HEENT: atraumatic, normocephalic, no scleral icterus or injection, ears and nose grossly normal; scalp and hair plugs appear normal, no rash or other lesions  CV: RRR, no murmurs/rubs/gallops, normal S1 and S2  Lungs: CTAB, no wheezes or crackles, breathing comfortably on room air  : Deferred per patient's request  Neuro: alert, oriented x3, CNs grossly intact, no focal deficits appreciated  Psych: normal mood/affect/behavior, answering questions appropriately, linear thought process

## 2021-07-03 NOTE — ADDENDUM NOTE
Addendum Note by Ruby Worrell CMA at 11/5/2018  2:48 PM     Author: Ruby Worrell CMA Service: -- Author Type: Certified Medical Assistant    Filed: 11/5/2018  2:48 PM Encounter Date: 11/5/2018 Status: Signed    : Ruby Worrell CMA (Certified Medical Assistant)    Addended by: RUBY WORRELL on: 11/5/2018 02:48 PM        Modules accepted: Orders

## 2021-10-25 ENCOUNTER — OFFICE VISIT (OUTPATIENT)
Dept: FAMILY MEDICINE | Facility: CLINIC | Age: 41
End: 2021-10-25
Payer: COMMERCIAL

## 2021-10-25 VITALS
OXYGEN SATURATION: 98 % | BODY MASS INDEX: 27.92 KG/M2 | HEART RATE: 83 BPM | WEIGHT: 167.8 LBS | SYSTOLIC BLOOD PRESSURE: 132 MMHG | DIASTOLIC BLOOD PRESSURE: 88 MMHG

## 2021-10-25 DIAGNOSIS — E11.9 TYPE 2 DIABETES MELLITUS WITHOUT COMPLICATION, WITHOUT LONG-TERM CURRENT USE OF INSULIN (H): ICD-10-CM

## 2021-10-25 DIAGNOSIS — M54.6 ACUTE LEFT-SIDED THORACIC BACK PAIN: ICD-10-CM

## 2021-10-25 DIAGNOSIS — Z31.69 INFERTILITY COUNSELING: Primary | ICD-10-CM

## 2021-10-25 DIAGNOSIS — I10 PRIMARY HYPERTENSION: ICD-10-CM

## 2021-10-25 LAB
ALBUMIN SERPL-MCNC: 4.1 G/DL (ref 3.5–5)
ALP SERPL-CCNC: 102 U/L (ref 45–120)
ALT SERPL W P-5'-P-CCNC: 34 U/L (ref 0–45)
ANION GAP SERPL CALCULATED.3IONS-SCNC: 12 MMOL/L (ref 5–18)
AST SERPL W P-5'-P-CCNC: 20 U/L (ref 0–40)
BILIRUB SERPL-MCNC: 0.3 MG/DL (ref 0–1)
BUN SERPL-MCNC: 8 MG/DL (ref 8–22)
CALCIUM SERPL-MCNC: 10.2 MG/DL (ref 8.5–10.5)
CHLORIDE BLD-SCNC: 102 MMOL/L (ref 98–107)
CO2 SERPL-SCNC: 25 MMOL/L (ref 22–31)
CREAT SERPL-MCNC: 0.68 MG/DL (ref 0.7–1.3)
CREAT UR-MCNC: 107 MG/DL
GFR SERPL CREATININE-BSD FRML MDRD: >90 ML/MIN/1.73M2
GLUCOSE BLD-MCNC: 324 MG/DL (ref 70–125)
HBA1C MFR BLD: 12.3 % (ref 0–5.6)
MICROALBUMIN UR-MCNC: 0.77 MG/DL (ref 0–1.99)
MICROALBUMIN/CREAT UR: 7.2 MG/G CR
POTASSIUM BLD-SCNC: 4.1 MMOL/L (ref 3.5–5)
PROT SERPL-MCNC: 7.4 G/DL (ref 6–8)
SODIUM SERPL-SCNC: 139 MMOL/L (ref 136–145)

## 2021-10-25 PROCEDURE — 80053 COMPREHEN METABOLIC PANEL: CPT | Performed by: STUDENT IN AN ORGANIZED HEALTH CARE EDUCATION/TRAINING PROGRAM

## 2021-10-25 PROCEDURE — 36415 COLL VENOUS BLD VENIPUNCTURE: CPT | Performed by: STUDENT IN AN ORGANIZED HEALTH CARE EDUCATION/TRAINING PROGRAM

## 2021-10-25 PROCEDURE — 82043 UR ALBUMIN QUANTITATIVE: CPT | Performed by: STUDENT IN AN ORGANIZED HEALTH CARE EDUCATION/TRAINING PROGRAM

## 2021-10-25 PROCEDURE — 99214 OFFICE O/P EST MOD 30 MIN: CPT | Performed by: STUDENT IN AN ORGANIZED HEALTH CARE EDUCATION/TRAINING PROGRAM

## 2021-10-25 PROCEDURE — 83036 HEMOGLOBIN GLYCOSYLATED A1C: CPT | Performed by: STUDENT IN AN ORGANIZED HEALTH CARE EDUCATION/TRAINING PROGRAM

## 2021-10-25 RX ORDER — EMPAGLIFLOZIN 10 MG/1
10 TABLET, FILM COATED ORAL DAILY
Qty: 90 TABLET | Refills: 3 | Status: SHIPPED | OUTPATIENT
Start: 2021-10-25 | End: 2022-12-01

## 2021-10-25 RX ORDER — GLYBURIDE 5 MG/1
TABLET ORAL
Qty: 180 TABLET | Refills: 3 | Status: SHIPPED | OUTPATIENT
Start: 2021-10-25 | End: 2022-12-01

## 2021-10-25 RX ORDER — ATORVASTATIN CALCIUM 20 MG/1
20 TABLET, FILM COATED ORAL DAILY
Qty: 90 TABLET | Refills: 3 | Status: SHIPPED | OUTPATIENT
Start: 2021-10-25 | End: 2022-12-01

## 2021-10-25 RX ORDER — METFORMIN HCL 500 MG
1000 TABLET, EXTENDED RELEASE 24 HR ORAL DAILY
Qty: 180 TABLET | Refills: 3 | Status: SHIPPED | OUTPATIENT
Start: 2021-10-25 | End: 2022-11-02

## 2021-10-25 NOTE — PROGRESS NOTES
Assessment and Plan     41-year-old male with past medical history of type 2 diabetes, hypogonadism, hypertension who presents for refill of his various type 2 diabetes medications which she has been out for couple days as well as request for lab testing.  I refilled his medicines and did perform some basic lab test but recommended he come back in for a physical in the next month or 2.  Patient had concern of back pain which I think is most likely paraspinal tenderness from long .  Recommended conservative management with heat NSAIDs and Tylenol.  Finally patient concerned about infertility.  He does have a history of hypogonadism.  And I can see he has had some lab tests for this previously.  I recommended more comprehensive work-up with him and his wife and placed infertility referral.    1. Type 2 diabetes mellitus without complication, without long-term current use of insulin (H)  - metFORMIN (GLUCOPHAGE-XR) 500 MG 24 hr tablet; Take 2 tablets (1,000 mg) by mouth daily  Dispense: 180 tablet; Refill: 3  - JARDIANCE 10 MG TABS tablet; Take 1 tablet (10 mg) by mouth daily  Dispense: 90 tablet; Refill: 3  - glyBURIDE (DIABETA /MICRONASE) 5 MG tablet; [GLYBURIDE (DIABETA) 5 MG TABLET] TAKE 1 TABLET (5 MG TOTAL) BY MOUTH 2 (TWO) TIMES A DAY WITH MEALS.  Dispense: 180 tablet; Refill: 3  - Hemoglobin A1c; Future  - Comprehensive metabolic panel (BMP + Alb, Alk Phos, ALT, AST, Total. Bili, TP); Future  - Albumin Random Urine Quantitative with Creat Ratio; Future    2. Primary hypertension  - atorvastatin (LIPITOR) 20 MG tablet; Take 1 tablet (20 mg) by mouth daily  Dispense: 90 tablet; Refill: 3    3. Infertility counseling  - Infertility/AI Referral; Future    4. Acute left-sided thoracic back pain    Follow up: pending lab testing  Options for treatment and follow-up care were reviewed with the patient and/or guardian. Heidi Boyce and/or guardian engaged in the decision making process and verbalized  understanding of the options discussed and agreed with the final plan.    Dr. José Miguel Mckeon         HPI:   Heidi Boyce is a 41 year old  male who presents for:    Chief Complaint   Patient presents with     med check     concerns     pain in upper back, check blood pressure, kidneys, liver.      Patient presents today for renewal of all of his medications.  He tells me he has been off of them for a couple days.  He has a history of type 2 diabetes and is on several medications to improve this including Jardiance, glyburide, Metformin.  Appears his diabetes was last followed up on 2 years ago by Dr. Jasso one of my partners.  He has not had an A1c checked since then.  Last A1c was 8 point something.    .  Patient also would like general blood test today.  I did recommend some but likely need to come back for a physical.    Finally patient wished to discuss upper back pain.  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    Finally patient tells me he had work-up for infertility while in [place].  He was told he had a low sperm count and low testosterone.  He has been trying to get pregnant with his wife over the last 7 years he tells me with regular intercourse.  He has had children before with his wife.         PMHX:     Patient Active Problem List   Diagnosis     Obesity     Vitamin D Deficiency     Type 2 diabetes mellitus without complication (H)     Abnormal MRI, lumbar spine     Essential hypertension     Hypogonadism in male     Erectile dysfunction associated with type 2 diabetes mellitus (H)     Fatty liver     Kidney stone       Current Outpatient Medications   Medication Sig Dispense Refill     atorvastatin (LIPITOR) 20 MG tablet [ATORVASTATIN (LIPITOR) 20 MG TABLET] Take 1 tablet (20 mg total) by mouth daily. (Patient not taking: Reported on 10/25/2021) 90 tablet 3     glyBURIDE (DIABETA) 5 MG tablet [GLYBURIDE (DIABETA) 5 MG TABLET] TAKE 1 TABLET (5 MG  TOTAL) BY MOUTH 2 (TWO) TIMES A DAY WITH MEALS. (Patient not taking: Reported on 10/25/2021) 180 tablet 3     JARDIANCE 10 mg Tab [JARDIANCE 10 MG TAB] Take 1 tablet (10 mg total) by mouth daily. (Patient not taking: Reported on 10/25/2021) 90 tablet 3     lancets 25 gauge Misc [LANCETS 25 GAUGE MISC] As directed. Dispense item covered by pt ins. E11.65 NIDDM type II, uncontrolled - Test 4 times/day. Reason: High A1C (Patient not taking: Reported on 10/25/2021)       lisinopril (PRINIVIL,ZESTRIL) 20 MG tablet [LISINOPRIL (PRINIVIL,ZESTRIL) 20 MG TABLET] Take 1 tablet (20 mg total) by mouth daily. (Patient not taking: Reported on 10/25/2021) 90 tablet 3     metFORMIN (GLUCOPHAGE-XR) 500 MG 24 hr tablet [METFORMIN (GLUCOPHAGE-XR) 500 MG 24 HR TABLET] Take 2 tablets (1,000 mg total) by mouth daily. (Patient not taking: Reported on 10/25/2021) 180 tablet 3       Social History     Tobacco Use     Smoking status: Never Smoker     Smokeless tobacco: Never Used   Substance Use Topics     Alcohol use: No     Drug use: No       Social History     Social History Narrative     Not on file       No Known Allergies    No results found for this or any previous visit (from the past 24 hour(s)).         Review of Systems:    ROS: 10 point ROS neg other than the symptoms noted above in the HPI.         Physical Exam:     Vitals:    10/25/21 1629   BP: 132/88   BP Location: Left arm   Patient Position: Sitting   Cuff Size: Adult Regular   Pulse: 83   SpO2: 98%   Weight: 76.1 kg (167 lb 12.8 oz)     Body mass index is 27.92 kg/m .    General appearance: Alert, cooperative, no distress, appears stated age  Head: Normocephalic, atraumatic, without obvious abnormality  Eyes: Pupils equal round, reactive.  Conjunctiva clear.  Nose: Nares normal, no drainage.  Throat: Lips, mucosa, tongue normal mucosa pink and moist  Neck: Supple, symmetric, trachea midline  Lungs: Clear to auscultation bilaterally, no wheezing or crackles present.   Respirations unlabored  Heart: Regular rate and rhythm, normal S1 and S2, no murmur, rub or gallop.    BACK:   ROM: flexion -full /extension -full /lateral rotation- full /side bend- full   Bony tenderness: None   Paraspinal tenderness: yes thoracic on left  Sensation: intact in b/l lower extremities.   Strength: 5/5 w/ dorsiflexion/ plantarflexion/ inversion/ eversion/ knee flexion/ extension.   Maneuvers: Neg straight leg raise b/l. Neg Slump b/l Neg VICTORINO/FADER

## 2021-10-26 NOTE — RESULT ENCOUNTER NOTE
I called and spoke with the patient about their recent clinic visit results. I answered any questions they had. Patient restarting diabetic medicines. He states he has been out for 4 days but I suspect longer. Recommended he restart checking fasting BS daily and Follow-up in 2-3 weeks for diabetic visit with me or PCP.      Dr. José Miguel Mckeon

## 2021-12-20 ENCOUNTER — LAB REQUISITION (OUTPATIENT)
Dept: LAB | Facility: CLINIC | Age: 41
End: 2021-12-20
Payer: COMMERCIAL

## 2021-12-20 ENCOUNTER — HOSPITAL ENCOUNTER (EMERGENCY)
Facility: HOSPITAL | Age: 41
Discharge: HOME | End: 2021-12-20
Attending: EMERGENCY MEDICINE | Admitting: EMERGENCY MEDICINE
Payer: COMMERCIAL

## 2021-12-20 ENCOUNTER — TRANSFERRED RECORDS (OUTPATIENT)
Dept: HEALTH INFORMATION MANAGEMENT | Facility: CLINIC | Age: 41
End: 2021-12-20

## 2021-12-20 VITALS
RESPIRATION RATE: 16 BRPM | DIASTOLIC BLOOD PRESSURE: 89 MMHG | TEMPERATURE: 97.9 F | SYSTOLIC BLOOD PRESSURE: 147 MMHG | HEIGHT: 65 IN | BODY MASS INDEX: 29.99 KG/M2 | WEIGHT: 180 LBS | OXYGEN SATURATION: 98 % | HEART RATE: 86 BPM

## 2021-12-20 DIAGNOSIS — H15.012 ANTERIOR SCLERITIS, LEFT EYE: ICD-10-CM

## 2021-12-20 DIAGNOSIS — H57.12 ACUTE LEFT EYE PAIN: ICD-10-CM

## 2021-12-20 LAB
C REACTIVE PROTEIN LHE: 2.8 MG/DL (ref 0–0.8)
ERYTHROCYTE [DISTWIDTH] IN BLOOD BY AUTOMATED COUNT: 13.1 % (ref 10–15)
ERYTHROCYTE [SEDIMENTATION RATE] IN BLOOD BY WESTERGREN METHOD: 10 MM/HR (ref 0–15)
HCT VFR BLD AUTO: 46.5 % (ref 40–53)
HGB BLD-MCNC: 15.3 G/DL (ref 13.3–17.7)
MCH RBC QN AUTO: 26.8 PG (ref 26.5–33)
MCHC RBC AUTO-ENTMCNC: 32.9 G/DL (ref 31.5–36.5)
MCV RBC AUTO: 81 FL (ref 78–100)
PLATELET # BLD AUTO: 327 10E3/UL (ref 150–450)
RBC # BLD AUTO: 5.71 10E6/UL (ref 4.4–5.9)
RETINOPATHY: NEGATIVE
RHEUMATOID FACT SER NEPH-ACNC: <15 IU/ML (ref 0–30)
T PALLIDUM AB SER QL: NONREACTIVE
WBC # BLD AUTO: 9.6 10E3/UL (ref 4–11)

## 2021-12-20 PROCEDURE — 86481 TB AG RESPONSE T-CELL SUSP: CPT | Mod: ORL | Performed by: OPHTHALMOLOGY

## 2021-12-20 PROCEDURE — 86780 TREPONEMA PALLIDUM: CPT | Mod: ORL | Performed by: OPHTHALMOLOGY

## 2021-12-20 PROCEDURE — 250N000009 HC RX 250: Performed by: EMERGENCY MEDICINE

## 2021-12-20 PROCEDURE — 86200 CCP ANTIBODY: CPT | Mod: ORL | Performed by: OPHTHALMOLOGY

## 2021-12-20 PROCEDURE — 99283 EMERGENCY DEPT VISIT LOW MDM: CPT

## 2021-12-20 PROCEDURE — 82164 ANGIOTENSIN I ENZYME TEST: CPT | Mod: ORL | Performed by: OPHTHALMOLOGY

## 2021-12-20 PROCEDURE — 85652 RBC SED RATE AUTOMATED: CPT | Mod: ORL | Performed by: OPHTHALMOLOGY

## 2021-12-20 PROCEDURE — 85027 COMPLETE CBC AUTOMATED: CPT | Mod: ORL | Performed by: OPHTHALMOLOGY

## 2021-12-20 PROCEDURE — 86038 ANTINUCLEAR ANTIBODIES: CPT | Mod: ORL | Performed by: OPHTHALMOLOGY

## 2021-12-20 PROCEDURE — 86256 FLUORESCENT ANTIBODY TITER: CPT | Mod: ORL | Performed by: OPHTHALMOLOGY

## 2021-12-20 PROCEDURE — 86141 C-REACTIVE PROTEIN HS: CPT | Mod: ORL | Performed by: OPHTHALMOLOGY

## 2021-12-20 PROCEDURE — 86431 RHEUMATOID FACTOR QUANT: CPT | Mod: ORL | Performed by: OPHTHALMOLOGY

## 2021-12-20 PROCEDURE — 36415 COLL VENOUS BLD VENIPUNCTURE: CPT | Mod: ORL | Performed by: OPHTHALMOLOGY

## 2021-12-20 PROCEDURE — 85549 MURAMIDASE: CPT | Mod: ORL | Performed by: OPHTHALMOLOGY

## 2021-12-20 RX ORDER — TETRACAINE HYDROCHLORIDE 5 MG/ML
2 SOLUTION OPHTHALMIC ONCE
Status: COMPLETED | OUTPATIENT
Start: 2021-12-20 | End: 2021-12-20

## 2021-12-20 RX ADMIN — TETRACAINE HYDROCHLORIDE 2 DROP: 5 SOLUTION OPHTHALMIC at 02:14

## 2021-12-20 RX ADMIN — FLUORESCEIN SODIUM 600 MCG: 0.6 STRIP OPHTHALMIC at 02:14

## 2021-12-20 ASSESSMENT — ENCOUNTER SYMPTOMS
EYE PAIN: 1
EYE ITCHING: 0
HEADACHES: 0
VOMITING: 0
NAUSEA: 0
FEVER: 0
PHOTOPHOBIA: 1
EYE REDNESS: 1
EYE DISCHARGE: 0
SHORTNESS OF BREATH: 0

## 2021-12-20 ASSESSMENT — VISUAL ACUITY
OS: 20/70;WITHOUT CORRECTIVE LENSES
OS: 20/70
OD: 20/70;WITHOUT CORRECTIVE LENSES
OD: 20/70

## 2021-12-20 ASSESSMENT — MIFFLIN-ST. JEOR: SCORE: 1653.35

## 2021-12-20 NOTE — Clinical Note
Heidi Boyce was seen and treated in our emergency department on 12/20/2021.  He may return to work on 12/21/2021.       If you have any questions or concerns, please don't hesitate to call.      Edel Haddad MD

## 2021-12-20 NOTE — ED TRIAGE NOTES
Patient here with left eye pain x 1 week. Denies eye discharge. Taking Ibuprofen prn without relief. Pt states he has been using allergy eye drops also. No known fevers. No contacts. Denies known eye injury. Vision wnl.

## 2021-12-20 NOTE — DISCHARGE INSTRUCTIONS
At 8am TODAY call [phone] to schedule an appointment to have ophthalmologist look at your eye at [facility]. They will tell you which doctor you will see, What time to arrive, and what clinic location to go to.    Return to the emergency department if you develop sudden vision changes or loss, significant worsening of your pain, or any other concerns.    Thank you for choosing [facility] Emergency Department.  It has been my pleasure caring for you today.     ~Dr. Catie MD

## 2021-12-20 NOTE — ED PROVIDER NOTES
EMERGENCY DEPARTMENT ENCOUNTER      NAME: Heidi Boyce  AGE: 41 year old male  YOB: 1980  MRN: [id number]  EVALUATION DATE & TIME: 2021  1:24 AM    PCP: Krishna Jasso    ED PROVIDER: Edel Haddad M.D.        Chief Complaint   Patient presents with     Eye Pain Left Eye         FINAL IMPRESSION:    1. Acute left eye pain            MEDICAL DECISION MAKIN year old male history of type 2 diabetes and hypertension who presents emergency department with left eye pain and redness. Began 1 week ago. Had similar symptoms in the right eye 2 weeks ago. Both eyes have not been affected simultaneously. Did see his eye doctor for the right eye that was going on and was told everything looked good. Here in the ER pressures seem pretty symmetric between the 2 eyes. Visual acuity symmetric in both eyes and patient denies any vision changes. Pupils are equal and reactive. Both are injected, but no purulent discharge. Fluorescein exam was negative for any ulcers or injury. Spoke with [facility] I would like to see the patient in her clinic this morning and patient provided with the contact information will call at 8 AM to schedule that      ED COURSE:  1:53 AM I met with the patient to gather history and perform my exam. ED course and treatment discussed.    2:20 AM Paged Ophthalmology, [facility]    2:22 AM Spoke with [facility], Dr. Conrad. He would like the patient to call his clinic in the morning to schedule appointment to be seen this morning. No emergent recommendations otherwise at this time.    2:36 AM  Patient was updated and agrees with this plan. Referral paperwork was faxed to [facility]. Pressures are not significantly elevated to suggest acute angle glaucoma. He is not having any vision changes or loss suggest any ocular or vascular compromise or retinal detachment or injury. Plan at this time is discharge home to follow-up closely with ophthalmology later this morning.  Nothing at this time is subacute stroke. Does not sound acute intracranial abnormality since this started in the right eye 2 weeks ago, resolved, and then began in the left eye.      COVID-19 PPE worn during patient evaluation:  Mask: n95 and homemade masks  Eye Protection: goggles   Gown: none  Hair cover: yes  Face shield: yes   Patient wearing a mask: yes    At the conclusion of the encounter I discussed the results of all of the tests and the disposition. Their questions were answered. The patient (and any family present) acknowledged understanding and were agreeable with the care plan.        CONSULTANTS:  [facility] - Dr. Conrad         MEDICATIONS GIVEN IN THE EMERGENCY:  Medications   tetracaine (PONTOCAINE) 0.5 % ophthalmic solution 2 drop (2 drops Left Eye Given by Other 12/20/21 0214)   fluorescein (FUL-SALINA) ophthalmic strip STRP 600 mcg (600 mcg Left Eye Given by Other 12/20/21 0214)           NEW PRESCRIPTIONS STARTED AT TODAY'S ER VISIT     Medication List      There are no discharge medications for this visit.             CONDITION:  stable        DISPOSITION:  discharge home         =================================================================  =================================================================    HPI    Patient information was obtained from: patient     Use of Intrepreter: N/A      Stormjeffrey MILTON Boyce is a 41 year old male with history of T2DM, essential hypertension, vitamin D deficiency, obesity, who presents to the ER with complaints of eye pain.    Patient reports that he has been having left eye pain and redness for 1 week. He notes the pain is provoked and worsened when he moves his eye, touches his eye, and with bright lights. He states he wears glasses, but denies any contact use. He denies any vision changes. He denies any known recent trauma to his eyes. He reports the pain has been worsening over the last couple of days, which prompted his visit to the ED. He denies  contacting his doctor at all for the past week while his eye has been bothering him. He mentions he took Ibuprofen for his symptoms, and reports that at rest his eye pain is resolved, but when he moves or touches his eye it is provoked. He denies any other complications at this time..     Patient reports he had the same symptoms in his right eye 2 weeks ago that has since resolved. He mentions that when he had the pain in his right eye he visited an eye doctor at [facility], who checked and looked at both of his eyes and reported that they both looked good and that he had no concerns. He states that the eye doctor at [facility] told the patient to use a prescribed allergy eye drop, but notes this prescribed allergy eye drop recently somehow got lost or thrown away. He states he was then recommended by the [facility] eye doctor to then buy over-the-counter allergy eye drops, but reports that he only bought natural eye drops, and notes the eye drops do not have any true medication in it. He endorses recently taking this natural eye drop for his current symptoms, but denies any relief. He also denies ever having both eyes hurt simultaneously.       REVIEW OF SYSTEMS  Review of Systems   Constitutional: Negative for fever.   Eyes: Positive for photophobia, pain and redness. Negative for discharge, itching and visual disturbance.   Respiratory: Negative for shortness of breath.    Cardiovascular: Negative for chest pain.   Gastrointestinal: Negative for nausea and vomiting.   Skin: Negative for rash.   Allergic/Immunologic: Negative for immunocompromised state.   Neurological: Negative for headaches.   All other systems reviewed and are negative.        PAST MEDICAL HISTORY:  Past Medical History:   Diagnosis Date     Diabetes (H)      Hypertension          PAST SURGICAL HISTORY:  History reviewed. No pertinent surgical history.      CURRENT MEDICATIONS:    Prior to Admission medications    Medication Sig Start Date End Date Taking?  Authorizing Provider   atorvastatin (LIPITOR) 20 MG tablet Take 1 tablet (20 mg) by mouth daily 10/25/21   José Miguel Merritt MD   glyBURIDE (DIABETA /MICRONASE) 5 MG tablet [GLYBURIDE (DIABETA) 5 MG TABLET] TAKE 1 TABLET (5 MG TOTAL) BY MOUTH 2 (TWO) TIMES A DAY WITH MEALS. 10/25/21   José Miguel Merritt MD   JARDIANCE 10 MG TABS tablet Take 1 tablet (10 mg) by mouth daily 10/25/21   José Miguel Merritt MD   lancets 25 gauge Misc [LANCETS 25 GAUGE MISC] As directed. Dispense item covered by pt ins. E11.65 NIDDM type II, uncontrolled - Test 4 times/day. Reason: High A1C  Patient not taking: Reported on 10/25/2021 3/6/17   Provider, Historical   lisinopril (PRINIVIL,ZESTRIL) 20 MG tablet [LISINOPRIL (PRINIVIL,ZESTRIL) 20 MG TABLET] Take 1 tablet (20 mg total) by mouth daily.  Patient not taking: Reported on 10/25/2021 7/16/19   Krishna Jasso MD   metFORMIN (GLUCOPHAGE-XR) 500 MG 24 hr tablet Take 2 tablets (1,000 mg) by mouth daily 10/25/21   José Miguel Merritt MD         ALLERGIES:  No Known Allergies      FAMILY HISTORY:  Family History   Problem Relation Age of Onset     Coronary Artery Disease Father          SOCIAL HISTORY:  Social History     Socioeconomic History     Marital status:      Spouse name: Not on file     Number of children: 2     Years of education: Not on file     Highest education level: Not on file   Occupational History     Not on file   Tobacco Use     Smoking status: Never Smoker     Smokeless tobacco: Never Used   Substance and Sexual Activity     Alcohol use: No     Drug use: No     Sexual activity: Never   Other Topics Concern     Not on file   Social History Narrative     Not on file     Social Determinants of Health     Financial Resource Strain: Not on file   Food Insecurity: Not on file   Transportation Needs: Not on file   Physical Activity: Not on file   Stress: Not on file   Social Connections: Not on file   Intimate Partner Violence: Not on file   Housing Stability: Not on file  "        VITALS:  Patient Vitals for the past 24 hrs:   BP Temp Temp src Pulse Resp SpO2 Height Weight   12/20/21 0122 (!) 147/89 97.9  F (36.6  C) Temporal 86 16 98 % 1.651 m (5' 5\") 81.6 kg (180 lb)       Wt Readings from Last 3 Encounters:   12/20/21 81.6 kg (180 lb)   10/25/21 76.1 kg (167 lb 12.8 oz)   07/16/19 83.7 kg (184 lb 8 oz)         PHYSICAL EXAM    Constitutional:  Well developed, Well nourished, NAD, GCS 15  HENT:  Normocephalic, Atraumatic, Bilateral external ears normal, Nose normal. Neck- Normal range of motion, No tenderness, Supple, No stridor.   Eyes:  PERRL, EOMI, Conjunctiva injected bilaterally, No discharge. Pt denies vision changes or loss. +eye pain with slight globe pressure.  Pupils are not mid or fixed. Fluorescein exam was done without any uptake or signs for injury or ulcer. Tetracaine was placed and patient did state that it did help his pain a little bit in general but overall the pain at rest was pretty negligible to begin with. Pain worsens with globe palpation or extraocular movements. Duc-Pen was used and he had pressures of 20-23 in the right eye and 21-26 in the left eye.  Visual Acuity-Left: 20/70  Visual Acuity-Right: 20/70  Respiratory:  No respiratory distress, Speaks full sentences easily. No cough.  Cardiovascular:  Normal heart rate  GI:  No obesity.    : deferred  Musculoskeletal: No cyanosis, No clubbing. Good range of motion in all major joints. No major deformities noted.   Integument:  Warm, Dry, No erythema, No rash.  No petechiae.   Neurologic:  Alert & oriented x 3, Normal motor function, Normal sensory function, No focal deficits noted. Normal gait.   Psychiatric:  Affect normal, Cooperative         LAB:  none    RADIOLOGY:  none    EKG:    none    PROCEDURES:  none      Doug MCKEON, am serving as a scribe to document services personally performed by Dr. Edel Haddad based on my observation and the provider's statements to me. IDr. Hollingsworth " MD Catie attest that Doug Tello is acting in a scribe capacity, has observed my performance of the services and has documented them in accordance with my direction.        Edel Haddad M.D. [facility]  Emergency Medicine and Medical Toxicology  [facility] EMERGENCY DEPARTMENT  [address] [place] [id number]  [phone]  Dept: [phone]           Edel Haddad MD  12/20/21 0735

## 2021-12-21 LAB
ACE SERPL-CCNC: 22 U/L
ANA SER QL IF: NEGATIVE
ANCA AB PATTERN SER IF-IMP: NORMAL
C-ANCA TITR SER IF: NORMAL {TITER}
CCP AB SER IA-ACNC: <0.5 U/ML
GAMMA INTERFERON BACKGROUND BLD IA-ACNC: 0.1 IU/ML
M TB IFN-G BLD-IMP: POSITIVE
M TB IFN-G CD4+ BCKGRND COR BLD-ACNC: 9.9 IU/ML
MITOGEN IGNF BCKGRD COR BLD-ACNC: 3.8 IU/ML
MITOGEN IGNF BCKGRD COR BLD-ACNC: 4.05 IU/ML
QUANTIFERON MITOGEN: 10 IU/ML
QUANTIFERON NIL TUBE: 0.1 IU/ML
QUANTIFERON TB1 TUBE: 4.15 IU/ML
QUANTIFERON TB2 TUBE: 3.9

## 2021-12-22 ENCOUNTER — TELEPHONE (OUTPATIENT)
Dept: FAMILY MEDICINE | Facility: CLINIC | Age: 41
End: 2021-12-22
Payer: COMMERCIAL

## 2021-12-22 DIAGNOSIS — R76.12 POSITIVE QUANTIFERON-TB GOLD TEST: ICD-10-CM

## 2021-12-22 DIAGNOSIS — H15.009 SCLERITIS, UNSPECIFIED LATERALITY: Primary | ICD-10-CM

## 2021-12-22 LAB — T PALLIDUM AB SER QL AGGL: NON REACTIVE

## 2021-12-22 NOTE — TELEPHONE ENCOUNTER
Call received from Dr. Blanca at [facility].  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  In further evaluation, noted to have positive QuantiFERON gold test.  I do not see record of previous tuberculosis in his chart nor prior tuberculosis screening results.  I share this with Dr. Blanca.  She had had patient on prednisone, discontinued this.  Instead we will begin NSAIDs.  Urgent referral placed to infectious disease clinic for further evaluation and assistance in management.  Dr. Blanca is preferring that infectious disease clinic let her know whether she may resume prednisone.  CEDRIC  
12-Oct-2020

## 2021-12-23 ENCOUNTER — TELEPHONE (OUTPATIENT)
Dept: INFECTIOUS DISEASES | Facility: CLINIC | Age: 41
End: 2021-12-23
Payer: COMMERCIAL

## 2021-12-23 ENCOUNTER — TRANSFERRED RECORDS (OUTPATIENT)
Dept: HEALTH INFORMATION MANAGEMENT | Facility: CLINIC | Age: 41
End: 2021-12-23
Payer: COMMERCIAL

## 2021-12-23 DIAGNOSIS — R76.11 POSITIVE TB TEST: Primary | ICD-10-CM

## 2021-12-23 LAB
LYSOZYME SERPL-MCNC: 1.24 UG/ML
RETINOPATHY: NEGATIVE

## 2021-12-23 NOTE — TELEPHONE ENCOUNTER
ID Team    Please review and advise on scheduling. Referring is asking for ASAP.      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 Berna Blanca of opthalmology ([phone]). Please call with questions.Scleritis, new positive tuberculosis screen. Unable to take steroids due to positive quantiferin gold screen. Requiring urgent evaluation for possible tuberculosis.

## 2021-12-27 ENCOUNTER — TELEPHONE (OUTPATIENT)
Dept: FAMILY MEDICINE | Facility: CLINIC | Age: 41
End: 2021-12-27
Payer: COMMERCIAL

## 2021-12-27 NOTE — TELEPHONE ENCOUNTER
Pt called stating that he tested positive for tuberculosis and wondering what he needs to do now.   Had blood test done at the eye      Wondering if needs an apt?

## 2021-12-28 ENCOUNTER — ANCILLARY PROCEDURE (OUTPATIENT)
Dept: GENERAL RADIOLOGY | Facility: CLINIC | Age: 41
End: 2021-12-28
Attending: NURSE PRACTITIONER
Payer: COMMERCIAL

## 2021-12-28 ENCOUNTER — OFFICE VISIT (OUTPATIENT)
Dept: FAMILY MEDICINE | Facility: CLINIC | Age: 41
End: 2021-12-28
Payer: COMMERCIAL

## 2021-12-28 ENCOUNTER — TELEPHONE (OUTPATIENT)
Dept: FAMILY MEDICINE | Facility: CLINIC | Age: 41
End: 2021-12-28

## 2021-12-28 VITALS
BODY MASS INDEX: 28.47 KG/M2 | WEIGHT: 171.1 LBS | SYSTOLIC BLOOD PRESSURE: 137 MMHG | DIASTOLIC BLOOD PRESSURE: 90 MMHG | OXYGEN SATURATION: 96 % | HEART RATE: 103 BPM

## 2021-12-28 DIAGNOSIS — E11.9 TYPE 2 DIABETES MELLITUS WITHOUT COMPLICATION, WITHOUT LONG-TERM CURRENT USE OF INSULIN (H): ICD-10-CM

## 2021-12-28 DIAGNOSIS — R76.12 POSITIVE QUANTIFERON-TB GOLD TEST: ICD-10-CM

## 2021-12-28 DIAGNOSIS — I10 ESSENTIAL HYPERTENSION: ICD-10-CM

## 2021-12-28 DIAGNOSIS — R76.12 POSITIVE QUANTIFERON-TB GOLD TEST: Primary | ICD-10-CM

## 2021-12-28 PROCEDURE — 71046 X-RAY EXAM CHEST 2 VIEWS: CPT | Mod: TC | Performed by: RADIOLOGY

## 2021-12-28 PROCEDURE — 99214 OFFICE O/P EST MOD 30 MIN: CPT | Performed by: NURSE PRACTITIONER

## 2021-12-28 RX ORDER — INDOMETHACIN 50 MG/1
50 CAPSULE ORAL 3 TIMES DAILY
COMMUNITY
Start: 2021-12-23 | End: 2022-12-01

## 2021-12-28 NOTE — TELEPHONE ENCOUNTER
----- Message from EUGENIA Anna CNP sent at 12/28/2021 10:09 AM CST -----  Please notify patient that radiology reviewed his chest x-ray and agree that there is no evidence of active tuberculosis which is reassuring.

## 2021-12-28 NOTE — TELEPHONE ENCOUNTER
Notified patient of message from Isabella Hansen. Patient verbalized understanding and has no further questions.

## 2021-12-28 NOTE — PROGRESS NOTES
"  Assessment & Plan     Positive QuantiFERON-TB Gold test  Patient tested positive on his quantiFERON -TB gold test earlier this week. He is not eliciting any concerning symptoms for active tuberculosis at this time.  Chest x-ray completed today and does not indicate active TB, confirmed by radiology.  We discussed that this is likely a latent infection. Urgent referral to infectious disease placed for further evaluation and management.  He is advised to closely monitor for any new symptoms or concerns and notify us in this event.  - XR Chest 2 Views  - Infectious Disease Referral    Type 2 diabetes without complication, without long-term use of insulin  Most recent hemoglobin A1c increased to 12.3.  He will be due for follow-up on this in approximately 1 month.  In the meantime he will continue with glyburide, Jardiance and Metformin as prescribed.  Encourage lifestyle modifications in regards to diet and exercise as well.    Hypertension  Blood pressure is just slightly elevated today.  Overall well controlled on current dose.  He will continue with lisinopril 20 mg daily.     BMI:   Estimated body mass index is 28.47 kg/m  as calculated from the following:    Height as of 12/20/21: 1.651 m (5' 5\").    Weight as of this encounter: 77.6 kg (171 lb 1.6 oz).       No follow-ups on file.    EUGENIA Jones CNP  [facility] OAKSTEFANIE Gordon is a 41 year old who presents for the following health issues: positive TB screen     HPI   Patient is here today to follow-up on recent positive TB screen.  He was seen by his ophthalmologist with concerns of scleritis.  Had lab work completed at that time.  His QuantiFERON-TB screen came back positive.  All other labs were unremarkable.  He was recommended to follow-up here today for chest x-ray prior to seeing infectious disease.  He is not aware of any known history of tuberculosis.  He denies any respiratory symptoms such as coughing shortness " of breath or systemic symptoms.  He reports traveling abroad to [facility] about 8 months ago.  Medical history significant for hypertension and type 2 diabetes.  His most recent hemoglobin A1c was increased to 12.3 which was up from 8.2 two years ago.  Current regimen includes Jardiance, glyburide and Metformin.  His blood pressure has been controlled on current dose of lisinopril.  He was prescribed indomethacin by his eye doctor for scleritis.  This has been helping to improve eye discomfort.  He has follow-up with eye doctor scheduled in a few weeks.  He does not have any additional concerns today.    Review of Systems   Review of Systems - pertinent positives noted in HPI, otherwise ROS is negative.        Objective    BP (!) 137/90 (BP Location: Left arm, Patient Position: Sitting, Cuff Size: Adult Large)   Pulse 103   Wt 77.6 kg (171 lb 1.6 oz)   SpO2 96%   BMI 28.47 kg/m    Body mass index is 28.47 kg/m .  Physical Exam     General Appearance:  Alert, cooperative, no distress, appears stated age   Lungs:   Clear to auscultation bilaterally, respirations unlabored.  No expiratory wheeze or inspiratory crackles noted.   Heart:  Regular rate and rhythm, S1, S2 normal, no murmur, rub or gallop   Extremities: Extremities normal.  No cyanosis or edema   Skin: Warm, dry.  Skin color, texture, turgor normal, no rashes or lesions   Neurologic:  Grossly intact.           Xray - Reviewed and interpreted by me.  No evidence of acute infiltrates or sign of active TB.    EXAM: XR CHEST 2 VW  LOCATION: [facility]  DATE/TIME: 12/28/2021 9:15 AM     INDICATION:  Positive QuantiFERON-TB Gold test  COMPARISON: None.                                                                      IMPRESSION:   No acute abnormality.     No focal pulmonary infiltrate, pleural effusion, or pneumothorax. The cardiac size and mediastinal contours appear within normal limits. No radiographic features to suggest active  tuberculosis.

## 2021-12-29 ENCOUNTER — OFFICE VISIT (OUTPATIENT)
Dept: INFECTIOUS DISEASES | Facility: CLINIC | Age: 41
End: 2021-12-29
Attending: FAMILY MEDICINE
Payer: COMMERCIAL

## 2021-12-29 VITALS — HEART RATE: 90 BPM | WEIGHT: 172.5 LBS | BODY MASS INDEX: 28.71 KG/M2 | TEMPERATURE: 97.6 F

## 2021-12-29 DIAGNOSIS — R76.12 POSITIVE QUANTIFERON-TB GOLD TEST: ICD-10-CM

## 2021-12-29 DIAGNOSIS — H15.009 SCLERITIS, UNSPECIFIED LATERALITY: ICD-10-CM

## 2021-12-29 PROCEDURE — 99204 OFFICE O/P NEW MOD 45 MIN: CPT | Performed by: INTERNAL MEDICINE

## 2021-12-29 RX ORDER — RIFAMPIN 300 MG/1
600 CAPSULE ORAL DAILY
Qty: 120 CAPSULE | Refills: 1 | Status: SHIPPED | OUTPATIENT
Start: 2021-12-29 | End: 2022-02-27

## 2022-03-02 ENCOUNTER — TELEPHONE (OUTPATIENT)
Dept: INFECTIOUS DISEASES | Facility: CLINIC | Age: 42
End: 2022-03-02

## 2022-03-02 NOTE — TELEPHONE ENCOUNTER
Called patient, no showed appt. Patient states has insurance issue he has to figure out. Currently driving, requesting for call back and leave  with clinic number. He will contact us next week to reschedule lab and in clinic f/u. Hopefully is able to continue LTB med refills. states no sx, no side effect and no concerns with treatment.    Please call patient next week to schedule lab and in clinic f/u if he does not call back by 3/10/22

## 2022-03-14 NOTE — TELEPHONE ENCOUNTER
3/14 - called x 2 - [place]. He should be returning soon and will call to schedule when he is back. Deferring message to 3/28 to schedule labs and f/up with dr kamara

## 2022-03-24 ENCOUNTER — LAB (OUTPATIENT)
Dept: LAB | Facility: CLINIC | Age: 42
End: 2022-03-24
Payer: COMMERCIAL

## 2022-03-24 DIAGNOSIS — R76.12 POSITIVE QUANTIFERON-TB GOLD TEST: ICD-10-CM

## 2022-03-24 LAB
ALBUMIN SERPL-MCNC: 3.7 G/DL (ref 3.5–5)
ALP SERPL-CCNC: 90 U/L (ref 45–120)
ALT SERPL W P-5'-P-CCNC: 11 U/L (ref 0–45)
AST SERPL W P-5'-P-CCNC: 10 U/L (ref 0–40)
BILIRUB DIRECT SERPL-MCNC: <0.1 MG/DL
BILIRUB SERPL-MCNC: 0.2 MG/DL (ref 0–1)
PROT SERPL-MCNC: 6.9 G/DL (ref 6–8)

## 2022-03-24 PROCEDURE — 36415 COLL VENOUS BLD VENIPUNCTURE: CPT

## 2022-03-24 PROCEDURE — 80076 HEPATIC FUNCTION PANEL: CPT

## 2022-04-01 NOTE — TELEPHONE ENCOUNTER
Date: 3/24/2022 Status: Completed   Time: 1:30 PM Length: 15   Visit Type: LAB [930] Copay: $0.00   Provider: [facility]

## 2022-06-14 ENCOUNTER — LAB (OUTPATIENT)
Dept: LAB | Facility: HOSPITAL | Age: 42
End: 2022-06-14
Payer: COMMERCIAL

## 2022-06-14 DIAGNOSIS — E11.9 DIABETES MELLITUS (H): Primary | ICD-10-CM

## 2022-06-14 DIAGNOSIS — R53.83 FATIGUE: ICD-10-CM

## 2022-06-14 DIAGNOSIS — Z13.228 SCREENING FOR PHENYLKETONURIA (PKU): ICD-10-CM

## 2022-06-14 DIAGNOSIS — Z13.29 SCREENING FOR THYROID DISORDER: ICD-10-CM

## 2022-06-14 LAB
ALBUMIN SERPL-MCNC: 3.9 G/DL (ref 3.5–5)
ALP SERPL-CCNC: 88 U/L (ref 45–120)
ALT SERPL W P-5'-P-CCNC: 15 U/L (ref 0–45)
ANION GAP SERPL CALCULATED.3IONS-SCNC: 9 MMOL/L (ref 5–18)
AST SERPL W P-5'-P-CCNC: 14 U/L (ref 0–40)
BILIRUB SERPL-MCNC: 0.5 MG/DL (ref 0–1)
BUN SERPL-MCNC: 9 MG/DL (ref 8–22)
CALCIUM SERPL-MCNC: 9.2 MG/DL (ref 8.5–10.5)
CHLORIDE BLD-SCNC: 103 MMOL/L (ref 98–107)
CO2 SERPL-SCNC: 27 MMOL/L (ref 22–31)
CREAT SERPL-MCNC: 0.77 MG/DL (ref 0.7–1.3)
ERYTHROCYTE [DISTWIDTH] IN BLOOD BY AUTOMATED COUNT: 12.6 % (ref 10–15)
FSH SERPL-ACNC: 4.9 MIU/ML (ref 0–12)
GFR SERPL CREATININE-BSD FRML MDRD: >90 ML/MIN/1.73M2
GLUCOSE BLD-MCNC: 304 MG/DL (ref 70–125)
HBA1C MFR BLD: 12.9 %
HCT VFR BLD AUTO: 43.8 % (ref 40–53)
HGB BLD-MCNC: 14.7 G/DL (ref 13.3–17.7)
MCH RBC QN AUTO: 27.6 PG (ref 26.5–33)
MCHC RBC AUTO-ENTMCNC: 33.6 G/DL (ref 31.5–36.5)
MCV RBC AUTO: 82 FL (ref 78–100)
PLATELET # BLD AUTO: 315 10E3/UL (ref 150–450)
POTASSIUM BLD-SCNC: 3.7 MMOL/L (ref 3.5–5)
PROT SERPL-MCNC: 7.5 G/DL (ref 6–8)
RBC # BLD AUTO: 5.33 10E6/UL (ref 4.4–5.9)
SODIUM SERPL-SCNC: 139 MMOL/L (ref 136–145)
TSH SERPL DL<=0.005 MIU/L-ACNC: 0.73 UIU/ML (ref 0.3–5)
WBC # BLD AUTO: 10.7 10E3/UL (ref 4–11)

## 2022-06-14 PROCEDURE — 84270 ASSAY OF SEX HORMONE GLOBUL: CPT

## 2022-06-14 PROCEDURE — 83036 HEMOGLOBIN GLYCOSYLATED A1C: CPT

## 2022-06-14 PROCEDURE — 85014 HEMATOCRIT: CPT

## 2022-06-14 PROCEDURE — 80053 COMPREHEN METABOLIC PANEL: CPT

## 2022-06-14 PROCEDURE — 82040 ASSAY OF SERUM ALBUMIN: CPT

## 2022-06-14 PROCEDURE — 83001 ASSAY OF GONADOTROPIN (FSH): CPT

## 2022-06-14 PROCEDURE — 84443 ASSAY THYROID STIM HORMONE: CPT

## 2022-06-14 PROCEDURE — 36415 COLL VENOUS BLD VENIPUNCTURE: CPT

## 2022-06-14 PROCEDURE — 84403 ASSAY OF TOTAL TESTOSTERONE: CPT

## 2022-06-15 LAB — SHBG SERPL-SCNC: 15 NMOL/L (ref 11–80)

## 2022-06-17 LAB
TESTOST FREE SERPL-MCNC: 5.73 NG/DL
TESTOST SERPL-MCNC: 206 NG/DL (ref 240–950)

## 2022-08-26 ENCOUNTER — NURSE TRIAGE (OUTPATIENT)
Dept: FAMILY MEDICINE | Facility: CLINIC | Age: 42
End: 2022-08-26

## 2022-11-01 DIAGNOSIS — E11.9 TYPE 2 DIABETES MELLITUS WITHOUT COMPLICATION, WITHOUT LONG-TERM CURRENT USE OF INSULIN (H): ICD-10-CM

## 2022-11-02 RX ORDER — METFORMIN HCL 500 MG
1000 TABLET, EXTENDED RELEASE 24 HR ORAL DAILY
Qty: 180 TABLET | Refills: 0 | Status: SHIPPED | OUTPATIENT
Start: 2022-11-02 | End: 2022-11-23

## 2022-11-02 NOTE — TELEPHONE ENCOUNTER
"Routing refill request to provider for review/approval because:  Labs not current:  A1C  Patient needs to be seen because it has been more than 6 months since last office visit.    Last Written Prescription Date:  10/25/21  Last Fill Quantity: 180,  # refills: 3   Last office visit provider:  12/28/21     Requested Prescriptions   Pending Prescriptions Disp Refills     metFORMIN (GLUCOPHAGE XR) 500 MG 24 hr tablet 180 tablet 3     Sig: Take 2 tablets (1,000 mg) by mouth daily       Biguanide Agents Failed - 11/2/2022 10:37 AM        Failed - Patient has documented A1c within the specified period of time.     If HgbA1C is 8 or greater, it needs to be on file within the past 3 months.  If less than 8, must be on file within the past 6 months.     Recent Labs   Lab Test 06/14/22  1615   A1C 12.9*             Failed - Recent (6 mo) or future (30 days) visit within the authorizing provider's specialty     Patient had office visit in the last 6 months or has a visit in the next 30 days with authorizing provider or within the authorizing provider's specialty.  See \"Patient Info\" tab in inbasket, or \"Choose Columns\" in Meds & Orders section of the refill encounter.            Passed - Patient is age 10 or older        Passed - Patient's CR is NOT>1.4 OR Patient's EGFR is NOT<45 within past 12 mos.     Recent Labs   Lab Test 06/14/22  1615 10/25/21  1713 11/01/18  0925   GFRESTIMATED >90   < > >60   GFRESTBLACK  --   --  >60    < > = values in this interval not displayed.       Recent Labs   Lab Test 06/14/22  1615   CR 0.77             Passed - Patient does NOT have a diagnosis of CHF.        Passed - Medication is active on med list             Pj Pineda RN 11/02/22 10:37 AM  "

## 2022-11-21 ENCOUNTER — OFFICE VISIT (OUTPATIENT)
Dept: FAMILY MEDICINE | Facility: CLINIC | Age: 42
End: 2022-11-21
Payer: COMMERCIAL

## 2022-11-21 VITALS
WEIGHT: 171.7 LBS | HEART RATE: 94 BPM | TEMPERATURE: 98.3 F | DIASTOLIC BLOOD PRESSURE: 92 MMHG | OXYGEN SATURATION: 100 % | SYSTOLIC BLOOD PRESSURE: 137 MMHG | BODY MASS INDEX: 28.57 KG/M2 | RESPIRATION RATE: 16 BRPM

## 2022-11-21 DIAGNOSIS — E11.69 ERECTILE DYSFUNCTION ASSOCIATED WITH TYPE 2 DIABETES MELLITUS (H): ICD-10-CM

## 2022-11-21 DIAGNOSIS — Z13.220 SCREENING FOR HYPERLIPIDEMIA: ICD-10-CM

## 2022-11-21 DIAGNOSIS — E11.65 TYPE 2 DIABETES MELLITUS WITH HYPERGLYCEMIA, WITHOUT LONG-TERM CURRENT USE OF INSULIN (H): Primary | ICD-10-CM

## 2022-11-21 DIAGNOSIS — I10 BENIGN ESSENTIAL HYPERTENSION: ICD-10-CM

## 2022-11-21 DIAGNOSIS — N52.1 ERECTILE DYSFUNCTION ASSOCIATED WITH TYPE 2 DIABETES MELLITUS (H): ICD-10-CM

## 2022-11-21 LAB
CHOLEST SERPL-MCNC: 168 MG/DL
CREAT UR-MCNC: 104 MG/DL
HBA1C MFR BLD: 13.3 % (ref 0–5.6)
HDLC SERPL-MCNC: 43 MG/DL
LDLC SERPL CALC-MCNC: 101 MG/DL
MICROALBUMIN UR-MCNC: <12 MG/L
MICROALBUMIN/CREAT UR: NORMAL MG/G{CREAT}
NONHDLC SERPL-MCNC: 125 MG/DL
TRIGL SERPL-MCNC: 121 MG/DL

## 2022-11-21 PROCEDURE — 80061 LIPID PANEL: CPT | Performed by: FAMILY MEDICINE

## 2022-11-21 PROCEDURE — 36415 COLL VENOUS BLD VENIPUNCTURE: CPT | Performed by: FAMILY MEDICINE

## 2022-11-21 PROCEDURE — 82043 UR ALBUMIN QUANTITATIVE: CPT | Performed by: FAMILY MEDICINE

## 2022-11-21 PROCEDURE — 99214 OFFICE O/P EST MOD 30 MIN: CPT | Performed by: FAMILY MEDICINE

## 2022-11-21 PROCEDURE — 83036 HEMOGLOBIN GLYCOSYLATED A1C: CPT | Performed by: FAMILY MEDICINE

## 2022-11-21 RX ORDER — LISINOPRIL 20 MG/1
20 TABLET ORAL DAILY
Qty: 90 TABLET | Refills: 3 | Status: SHIPPED | OUTPATIENT
Start: 2022-11-21 | End: 2022-12-01

## 2022-11-21 ASSESSMENT — PAIN SCALES - GENERAL: PAINLEVEL: NO PAIN (0)

## 2022-11-21 NOTE — PROGRESS NOTES
"  Assessment & Plan     Screening for hyperlipidemia  Work-up to include  - Lipid panel reflex to direct LDL Non-fasting    Erectile dysfunction associated with type 2 diabetes mellitus (H)  Work-up as follows  - Lipid panel reflex to direct LDL Non-fasting  - Albumin Random Urine Quantitative with Creat Ratio    Type 2 diabetes mellitus with hyperglycemia, without long-term current use of insulin (H)  Work-up to include  - Hemoglobin A1c  - Med Therapy Management Referral    Benign essential hypertension  Renew medication  - lisinopril (ZESTRIL) 20 MG tablet  Dispense: 90 tablet; Refill: 3  - Med Therapy Management Referral               BMI:   Estimated body mass index is 28.57 kg/m  as calculated from the following:    Height as of 12/20/21: 1.651 m (5' 5\").    Weight as of this encounter: 77.9 kg (171 lb 11.2 oz).         No follow-ups on file.    Krishna Jasso MD  [facility] KRISTIN Gordon is a 42 year old, presenting for the following health issues:  Diabetes (Recheck diabetes )      HPI patient's been under my care for diabetes mellitus; he also has benign essential hypertension he quit taking his medications; he is having problems with his erection.  He does have hyper cholesterol anemia he is on atorvastatin.  Current diabetes management metformin Jardiance glimepiride with A1c is running around 12; the patient obviously needs Lantus and perhaps prandial insulin diabetes education follow-up with glucose monitoring.  He is very upset about his infections which are 0 at this point of check his testosterone and sperm count which are low.  I think if we get his diabetes under control blood pressure under control and educated where we can restore his wife to the quality and quantity that he wants.  I put in referral from diabetes education perhaps insulin addition.  Patient understands appropriate laboratory done today.  Is refusing any immunizations at this point time spent " face-to-face and non-face-to-face reviewing chart 24 minutes          Review of Systems   Constitutional, HEENT, cardiovascular, pulmonary, gi and gu systems are negative, except as otherwise noted.      Objective    BP (!) 137/92 (BP Location: Right arm, Patient Position: Sitting, Cuff Size: Adult Regular)   Pulse 94   Temp 98.3  F (36.8  C) (Oral)   Resp 16   Wt 77.9 kg (171 lb 11.2 oz)   SpO2 100%   BMI 28.57 kg/m    Body mass index is 28.57 kg/m .  Physical Exam   GENERAL: healthy, alert and no distress  NECK: no adenopathy, no asymmetry, masses, or scars and thyroid normal to palpation  RESP: lungs clear to auscultation - no rales, rhonchi or wheezes  CV: regular rate and rhythm, normal S1 S2, no S3 or S4, no murmur, click or rub, no peripheral edema and peripheral pulses strong  ABDOMEN: soft, nontender, no hepatosplenomegaly, no masses and bowel sounds normal  MS: no gross musculoskeletal defects noted, no edema

## 2022-11-22 ENCOUNTER — TELEPHONE (OUTPATIENT)
Dept: FAMILY MEDICINE | Facility: CLINIC | Age: 42
End: 2022-11-22

## 2022-11-22 DIAGNOSIS — E11.9 TYPE 2 DIABETES MELLITUS WITHOUT COMPLICATION, WITHOUT LONG-TERM CURRENT USE OF INSULIN (H): ICD-10-CM

## 2022-11-22 NOTE — TELEPHONE ENCOUNTER
Pt is calling stating that he needs his metformin prescription as soon as possible since he is going out of town tomorrow.     Informed patient that Dr. Jasso sent prescription over 11/02/22 for a 90 day supply.    Pt is going to check with pharmacy.

## 2022-11-23 DIAGNOSIS — E11.9 TYPE 2 DIABETES MELLITUS WITHOUT COMPLICATION, WITHOUT LONG-TERM CURRENT USE OF INSULIN (H): ICD-10-CM

## 2022-11-23 RX ORDER — METFORMIN HCL 500 MG
1000 TABLET, EXTENDED RELEASE 24 HR ORAL DAILY
Qty: 180 TABLET | Refills: 0 | Status: SHIPPED | OUTPATIENT
Start: 2022-11-23 | End: 2023-02-13

## 2022-11-30 NOTE — PROGRESS NOTES
Medication Therapy Management (MTM) Encounter    ASSESSMENT:                            Type 2 Diabetes: Last A1c not at goal 7%, A1c was quite elevated.  We reviewed that ideally we should optimize his metformin, but I do not think that we will drop his A1c significantly, therefore we will add a once weekly GLP-1 agonist and titrate up.  Eventually we also may need to increase the metformin.  We discussed a trial of Trulicity and he was interested.  We reviewed mechanism of action, storage, administration, and side effects.  I also provided him with a Accu-Chek guide glucometer today and showed him how to use it.  We will send him supplies.  Reviewed that he can check a few times a week in order for us to see if he is responding to the Trulicity.  We reviewed blood sugar goals.  Discussed that his sexual issues may improve with improvement in his blood sugars.  Okay to be off atorvastatin and aspirin at this time, will reevaluate at follow-up.    Hypertension: Blood pressure is worse today.  He confirms that he has been taking lisinopril.  However will increase the dose today to 30 mg and recheck at follow-up along with a BMP.      PLAN:                            1. Start Trulicity 0.75 mg once weekly. I will check the insurance coverage   2. Increase lisinopril to 30 mg daily for blood pressure.   3. Accu-Chek guide me glucometer provided today.  Testing supplies sent to pharmacy.  Patient to start checking blood sugars at least 3 times weekly.    Follow-up: Pending Trulicity coverage     SUBJECTIVE/OBJECTIVE:                          Heidi Boyce is a 42 year old male coming in for an initial visit. He was referred to me from Dr. Jasso.      Reason for visit: Diabetes   Medication Adherence/Access: Patient did not bring his medications with him today.  Per his med list, he reports that he is only taking metformin and lisinopril.    Type 2 Diabetes: Currently taking metformin XR x2 (1000 mg) daily.  Reports that  he was on metformin the day he met with Dr. Jasso.  Denies any loose stools or diarrhea.  He thinks he may have been on 2000 mg/day of metformin in the past.  He is not on Jardiance or glyburide on file.  He is interested in a once weekly injection.  He would like something to help his blood sugars and his sex life.  Eats 2 meals BF/ dinner.   Tests BG 0 times daily.  He thinks he has a old glucometer at home but it does not work.  Last A1c checked 11/21/2022 = 13.3%.   Hyperglycemia symptoms: Polyuria  Microalbumin checked 11/21/2022  Last GFR >90 ml/min on 6/14/2022  Is not taking atorvastatin 20 mg right now.  Is not taking aspirin 81 mg for primary prevention.   Lab Test 11/21/22  0718 04/06/18  0832   CHOL 168 173   HDL 43 36*   * 114   TRIG 121 116     The 10-year ASCVD risk score (Laura KNIGHT, et al., 2019) is: 3.7%    Values used to calculate the score:      Age: 42 years      Sex: Male      Is Non- : No      Diabetic: Yes      Tobacco smoker: No      Systolic Blood Pressure: 159 mmHg      Is BP treated: Yes      HDL Cholesterol: 43 mg/dL      Total Cholesterol: 168 mg/dL      Hypertension: Currently taking lisinopril 20 mg daily -reports that he was not taking this when he met with Dr. Jasso.  Confirms that he took it last night.  Patient does not monitor BP at home.  BP Readings from Last 3 Encounters:   12/01/22 (!) 159/92   11/21/22 (!) 137/92   12/28/21 (!) 137/90       Today's Vitals: BP (!) 159/92 (BP Location: Right arm)   Pulse 105      Allergies/ADRs: Reviewed in chart  Past Medical History: Reviewed in chart  Tobacco: He reports that he has never smoked. He has never used smokeless tobacco.  Alcohol: Reviewed in chart      ----------------      I spent 30 minutes with this patient today. All changes were made via collaborative practice agreement with Krishna Jasso MD. A copy of the visit note was provided to the patient's provider(s).    A summary of these  recommendations was given to the patient.    Lorrie Simmons, Pharm.D., BCACP   Medication Therapy Management Pharmacist   [facility]      Medication Therapy Recommendations  Essential hypertension    Current Medication: lisinopril (ZESTRIL) 20 MG tablet (Discontinued)   Rationale: Dose too low - Dosage too low - Effectiveness   Recommendation: Increase Dose   Status: Accepted per CPA         Type 2 diabetes mellitus without complication (H)    Current Medication: dulaglutide (TRULICITY) 0.75 MG/0.5ML pen   Rationale: Synergistic therapy - Needs additional medication therapy - Indication   Recommendation: Start Medication   Status: Accepted per CPA          Current Medication: metFORMIN (GLUCOPHAGE XR) 500 MG 24 hr tablet   Rationale: Medication requires monitoring - Needs additional monitoring   Recommendation: Self-Monitoring   Status: Accepted per CPA

## 2022-12-01 ENCOUNTER — OFFICE VISIT (OUTPATIENT)
Dept: PHARMACY | Facility: CLINIC | Age: 42
End: 2022-12-01
Payer: COMMERCIAL

## 2022-12-01 VITALS — SYSTOLIC BLOOD PRESSURE: 159 MMHG | HEART RATE: 105 BPM | DIASTOLIC BLOOD PRESSURE: 92 MMHG

## 2022-12-01 DIAGNOSIS — E11.9 TYPE 2 DIABETES MELLITUS WITHOUT COMPLICATION, WITHOUT LONG-TERM CURRENT USE OF INSULIN (H): Primary | ICD-10-CM

## 2022-12-01 DIAGNOSIS — I10 ESSENTIAL HYPERTENSION: ICD-10-CM

## 2022-12-01 PROCEDURE — 99607 MTMS BY PHARM ADDL 15 MIN: CPT | Performed by: PHARMACIST

## 2022-12-01 PROCEDURE — 99605 MTMS BY PHARM NP 15 MIN: CPT | Performed by: PHARMACIST

## 2022-12-01 RX ORDER — DULAGLUTIDE 0.75 MG/.5ML
0.75 INJECTION, SOLUTION SUBCUTANEOUS
Qty: 2 ML | Refills: 3 | Status: SHIPPED | OUTPATIENT
Start: 2022-12-01 | End: 2023-09-21 | Stop reason: DRUGHIGH

## 2022-12-01 RX ORDER — LANCETS
EACH MISCELLANEOUS
Qty: 100 EACH | Refills: 3 | Status: SHIPPED | OUTPATIENT
Start: 2022-12-01

## 2022-12-01 RX ORDER — BLOOD SUGAR DIAGNOSTIC
STRIP MISCELLANEOUS
Qty: 100 STRIP | Refills: 3 | Status: SHIPPED | OUTPATIENT
Start: 2022-12-01

## 2022-12-01 RX ORDER — LISINOPRIL 30 MG/1
30 TABLET ORAL DAILY
Qty: 30 TABLET | Refills: 0 | Status: SHIPPED | OUTPATIENT
Start: 2022-12-01 | End: 2022-12-02

## 2022-12-01 NOTE — PATIENT INSTRUCTIONS
"Recommendations from today's MTM visit:                                                      Start Trulicity 0.75 mg once weekly. I will check the insurance coverage and call you.   Increase lisinopril to 30 mg daily for blood pressure. Ok to take one and half of the 20 mg for now.   I will send you more testing supplies - please check your blood sugar at least three times a week  Goals:    2 hours after eating: under 180    Follow-up: Tomorrow or next week I will call you about the Trulicity coverage     It was great speaking with you today.  I value your experience and would be very thankful for your time in providing feedback in our clinic survey. In the next few days, you may receive an email or text message from [facility] with a link to a survey related to your  clinical pharmacist.\"     To schedule another MTM appointment, please call the clinic directly or you may call the MTM scheduling line at [phone] or toll-free at [phone].     My Clinical Pharmacist's contact information:                                                      Please feel free to contact me with any questions or concerns you have.     Lorrie Simmons, Pharm.D., BCACP   Medication Therapy Management Pharmacist   [facility]       "

## 2022-12-02 RX ORDER — LISINOPRIL 30 MG/1
TABLET ORAL
Qty: 90 TABLET | Refills: 0 | Status: SHIPPED | OUTPATIENT
Start: 2022-12-02 | End: 2023-07-27

## 2022-12-16 ENCOUNTER — TELEPHONE (OUTPATIENT)
Dept: FAMILY MEDICINE | Facility: CLINIC | Age: 42
End: 2022-12-16

## 2022-12-16 NOTE — TELEPHONE ENCOUNTER
PA Initiation    Medication: blood glucose (ACCU-CHEK GUIDE) test strip  Insurance Company:  [employer]   Ph[facility] Filling the Rx:  [facility]  Filling Pharmacy Phone:  [phone]  Filling Pharmacy Fax:  [phone]  Start Date:  12/15/2022

## 2022-12-19 NOTE — TELEPHONE ENCOUNTER
Central Prior Authorization Team   Phone: [phone]    PA Initiation    Medication: blood glucose (ACCU-CHEK GUIDE) test strip  Insurance Company: [employer] - Phone [phone] Fax [phone]  Pharmacy Filling the Rx: [id number] [place], [place] - [address] [facility] [address]  Filling Pharmacy Phone: [phone]  Filling Pharmacy Fax:    Start Date: 12/19/2022

## 2022-12-21 NOTE — TELEPHONE ENCOUNTER
PRIOR AUTHORIZATION DENIED    Medication: blood glucose (ACCU-CHEK GUIDE) test strip    Denial Date: 12/21/2022    Denial Rational: Patient needs to use preferred products         Appeal Information:  If provider would like to appeal please provide a letter of medical necessity and route back to PA team.

## 2022-12-22 DIAGNOSIS — E11.9 TYPE 2 DIABETES MELLITUS WITHOUT COMPLICATION, WITHOUT LONG-TERM CURRENT USE OF INSULIN (H): Primary | ICD-10-CM

## 2022-12-22 NOTE — TELEPHONE ENCOUNTER
Prescription was denied on 12/21/22 as the Accu-check strips were not covered. I am pending a new prescription for you to sign to go to the pharmacy that is for generic and allows for substitution of strips based on patient's insurance coverage.

## 2023-02-11 DIAGNOSIS — E11.9 TYPE 2 DIABETES MELLITUS WITHOUT COMPLICATION, WITHOUT LONG-TERM CURRENT USE OF INSULIN (H): ICD-10-CM

## 2023-02-13 RX ORDER — METFORMIN HCL 500 MG
TABLET, EXTENDED RELEASE 24 HR ORAL
Qty: 180 TABLET | Refills: 0 | Status: SHIPPED | OUTPATIENT
Start: 2023-02-13 | End: 2023-10-12 | Stop reason: DRUGHIGH

## 2023-02-13 NOTE — TELEPHONE ENCOUNTER
"Last Written Prescription Date:  11/23/22  Last Fill Quantity: 180,  # refills: 0   Last office visit provider:  11/21/22     Requested Prescriptions   Pending Prescriptions Disp Refills     metFORMIN (GLUCOPHAGE XR) 500 MG 24 hr tablet [Pharmacy Med Name: METFORMIN ER 500MG 24HR TABS] 180 tablet 0     Sig: TAKE 2 TABLETS(1000 MG) BY MOUTH DAILY       Biguanide Agents Passed - 2/11/2023 10:16 AM        Passed - Patient is age 10 or older        Passed - Patient has documented A1c within the specified period of time.     If HgbA1C is 8 or greater, it needs to be on file within the past 3 months.  If less than 8, must be on file within the past 6 months.     Recent Labs   Lab Test 11/21/22  0718   A1C 13.3*             Passed - Patient's CR is NOT>1.4 OR Patient's EGFR is NOT<45 within past 12 mos.     Recent Labs   Lab Test 06/14/22  1615 10/25/21  1713 11/01/18  0925   GFRESTIMATED >90   < > >60   GFRESTBLACK  --   --  >60    < > = values in this interval not displayed.       Recent Labs   Lab Test 06/14/22  1615   CR 0.77             Passed - Patient does NOT have a diagnosis of CHF.        Passed - Medication is active on med list        Passed - Recent (6 mo) or future (30 days) visit within the authorizing provider's specialty     Patient had office visit in the last 6 months or has a visit in the next 30 days with authorizing provider or within the authorizing provider's specialty.  See \"Patient Info\" tab in inbasket, or \"Choose Columns\" in Meds & Orders section of the refill encounter.                 Elyssa Palencia RN 02/13/23 10:24 AM  "

## 2023-03-24 ENCOUNTER — TELEPHONE (OUTPATIENT)
Dept: PHARMACY | Facility: CLINIC | Age: 43
End: 2023-03-24
Payer: COMMERCIAL

## 2023-03-24 NOTE — TELEPHONE ENCOUNTER
Called patient to follow up on Trulicity and glucometer. He is also due for an A1c, BMP, and BP check. No answer, LVM.     Lorrie Whatley), Pharm.D., BCACP   Medication Therapy Management Pharmacist

## 2023-07-11 ENCOUNTER — TELEPHONE (OUTPATIENT)
Dept: PHARMACY | Facility: CLINIC | Age: 43
End: 2023-07-11
Payer: COMMERCIAL

## 2023-07-11 NOTE — TELEPHONE ENCOUNTER
Called patient to schedule MTM follow up visit to establish cares and also to obtain updated labs.  Was not able to get a hold of the patient and left voicemail.  We will also send a letter.    Marie Menjivar, PharmD  Medication Therapy Management Pharmacist   [facility]

## 2023-07-11 NOTE — LETTER
July 11, 2023      Heidi Boyce  [address]  [place] [address]        Michelle Heidi,       My name is Marie I am the new pharmacist that works with Dr. Jasso at the [facility].     Our records show that you are due for a Medication Therapy Management (MTM) appointment. This is an appointment to meet with a pharmacist in the clinic to make sure you get the most out of your medications.  Your medications play an important role in your health.  We would like to meet with you to review how your medications are working for you and to answer any questions you may have.       I am available in the clinic on Mondays and Thursdays for phone, video or in-person appointments and on Wednesdays for phone or video visits. To make an appointment, please call the clinic at [phone] or the MTM scheduling line at [phone].    I hope to see you soon!       Sincerely,      Marie Menjivar, PharmD  Medication Therapy Management Pharmacist   [facility]

## 2023-07-26 DIAGNOSIS — I10 ESSENTIAL HYPERTENSION: ICD-10-CM

## 2023-07-26 NOTE — TELEPHONE ENCOUNTER
"Routing refill request to provider for review/approval because:  Labs not current:  Creat, K+  A break in medication  Blood pressure readings not current    Last Written Prescription Date:  12/2/2022  Last Fill Quantity: 90,  # refills: 0   Last office visit provider:  11/21/2022     Requested Prescriptions   Pending Prescriptions Disp Refills    lisinopril (ZESTRIL) 30 MG tablet [Pharmacy Med Name: LISINOPRIL 30MG TABLETS] 90 tablet 0     Sig: TAKE 1 TABLET(30 MG) BY MOUTH DAILY       ACE Inhibitors (Including Combos) Protocol Failed - 7/26/2023  1:22 PM        Failed - Blood pressure under 140/90 in past 12 months     BP Readings from Last 3 Encounters:   12/01/22 (!) 159/92   11/21/22 (!) 137/92   12/28/21 (!) 137/90                 Failed - Normal serum creatinine on file in past 12 months     Recent Labs   Lab Test 06/14/22  1615   CR 0.77       Ok to refill medication if creatinine is low          Failed - Normal serum potassium on file in past 12 months     Recent Labs   Lab Test 06/14/22  1615   POTASSIUM 3.7             Passed - Recent (12 mo) or future (30 days) visit within the authorizing provider's specialty     Patient has had an office visit with the authorizing provider or a provider within the authorizing providers department within the previous 12 mos or has a future within next 30 days. See \"Patient Info\" tab in inbasket, or \"Choose Columns\" in Meds & Orders section of the refill encounter.              Passed - Medication is active on med list        Passed - Patient is age 18 or older             ALFONSO KAUFFMAN RN 07/26/23 1:22 PM  "

## 2023-07-27 RX ORDER — LISINOPRIL 30 MG/1
TABLET ORAL
Qty: 90 TABLET | Refills: 0 | Status: SHIPPED | OUTPATIENT
Start: 2023-07-27 | End: 2023-10-12

## 2023-08-09 ENCOUNTER — HOSPITAL ENCOUNTER (EMERGENCY)
Facility: HOSPITAL | Age: 43
Discharge: HOME | End: 2023-08-09
Attending: EMERGENCY MEDICINE | Admitting: EMERGENCY MEDICINE
Payer: COMMERCIAL

## 2023-08-09 VITALS
RESPIRATION RATE: 18 BRPM | SYSTOLIC BLOOD PRESSURE: 130 MMHG | TEMPERATURE: 98.2 F | HEART RATE: 119 BPM | OXYGEN SATURATION: 98 % | DIASTOLIC BLOOD PRESSURE: 82 MMHG

## 2023-08-09 DIAGNOSIS — L02.31 ABSCESS OF RIGHT BUTTOCK: ICD-10-CM

## 2023-08-09 PROCEDURE — 99283 EMERGENCY DEPT VISIT LOW MDM: CPT

## 2023-08-09 NOTE — Clinical Note
Heidi Boyce was seen and treated in our emergency department on 8/9/2023.  He may return to work on 08/11/2023.       If you have any questions or concerns, please don't hesitate to call.      Phillip Whitlock MD

## 2023-08-10 NOTE — ED TRIAGE NOTES
Heidi presents to triage wife with complaints of a cyst. Cyst is located on his right buttock. He reports that it has come back 4 times within 15 years.     Triage Assessment       Row Name 08/09/23 1924       Triage Assessment (Adult)    Airway WDL WDL       Respiratory WDL    Respiratory WDL WDL       Skin Circulation/Temperature WDL    Skin Circulation/Temperature WDL WDL       Cardiac WDL    Cardiac WDL X;rhythm    Pulse Rate & Regularity tachycardic       Peripheral/Neurovascular WDL    Peripheral Neurovascular WDL WDL       Cognitive/Neuro/Behavioral WDL    Cognitive/Neuro/Behavioral WDL WDL

## 2023-08-10 NOTE — ED PROVIDER NOTES
EMERGENCY DEPARTMENT ENCOUNTER      NAME: Heidi Boyce  AGE: 43 year old male  YOB: 1980  MRN: [id number]  EVALUATION DATE & TIME: 2023  7:26 PM    PCP: Krishna Jasso    ED PROVIDER: Phillip Whitlock M.D.      Chief Complaint   Patient presents with    Cyst         FINAL IMPRESSION:  1.  Acute, recurrent, right buttocks preabscess.      ED COURSE & MEDICAL DECISION MAKIN:40 PM.  I met with the patient to gather history and to perform my initial exam. We discussed plans for the ED course, including diagnostic testing and treatment. PPE worn: cloth mask.  Patient has an abscess or preabscess in the right buttocks lateral just posterior to the hip.  Approximate 1/2 inch x 1 inch.  Has had this recurrent 4 times over the last 15 years.  The lesion is not yet fluctuant at this time is still on the hard stage.  Patient will be discharged home on Augmentin and referral to his clinic for referral to general surgery for deeper incision and resection.  Patient in agreement with the plan.      Pertinent Labs & Imaging studies reviewed. (See chart for details)  43 year old male presents to the Emergency Department for evaluation of right buttocks sore.    At the conclusion of the encounter I discussed the results of all of the tests and the disposition. The questions were answered. The patient or family acknowledged understanding and was agreeable with the care plan.              Medical Decision Making    History:  Supplemental history from: Documented in chart, if applicable  External Record(s) reviewed: Both inpatient and outpatient computer records reviewed.    Work Up:  Chart documentation includes differential considered and any EKGs or imaging independently interpreted by provider, where specified.  Differential diagnosis includes infection, abscess, preabscess, cellulitis, etc.  In additional to work up documented, I considered the following work up: Documented in chart, if  applicable.    External consultation:  Discussion of management with another provider: Documented in chart, if applicable    Complicating factors:  Care impacted by chronic illness: Diabetes and hypertension.  Care affected by social determinants of health: Access to Medical Care    Disposition considerations: Patient discharged home on Augmentin.          MEDICATIONS GIVEN IN THE EMERGENCY:  Medications - No data to display    NEW PRESCRIPTIONS STARTED AT TODAY'S ER VISIT  New Prescriptions    No medications on file          =================================================================    HPI    Patient information was obtained from: The patient.    Use of : N/A         Heidi Boyce is a 43 year old male with a pertinent history of diabetes and hypertension who presents to this ED today for evaluation of sore in the lateral right buttocks, just adjacent and posterior to the hip.  Proximal 1/2 x 1 inch.  Hard and tender.  Not yet flexion at this time.  This is his fourth episode of returning abscess over the last 15 years.  At all times, previous treatment was in the emergency department where incision and drainage was done.  He otherwise    He does not identify any waxing or waning symptoms otherwise, exacerbating or alleviating features, associated symptoms except as mentioned.  He otherwise denies any pain related complaints.    REVIEW OF SYSTEMS   Review of Systems sore on right hip.  No fever, chills, sweats, etc.    PAST MEDICAL HISTORY:  Past Medical History:   Diagnosis Date    Diabetes (H)     Hypertension        PAST SURGICAL HISTORY:  History reviewed. No pertinent surgical history.        CURRENT MEDICATIONS:    blood glucose (ACCU-CHEK GUIDE) test strip  blood glucose (NO BRAND SPECIFIED) test strip  blood glucose monitoring (SOFTCLIX) lancets  dulaglutide (TRULICITY) 0.75 MG/0.5ML pen  lisinopril (ZESTRIL) 30 MG tablet  metFORMIN (GLUCOPHAGE XR) 500 MG 24 hr  tablet        ALLERGIES:  No Known Allergies    FAMILY HISTORY:  Family History   Problem Relation Age of Onset    Coronary Artery Disease Father        SOCIAL HISTORY:   Social History     Socioeconomic History    Marital status:      Spouse name: None    Number of children: 2    Years of education: None    Highest education level: None   Tobacco Use    Smoking status: Never    Smokeless tobacco: Never   Substance and Sexual Activity    Alcohol use: No    Drug use: No    Sexual activity: Never   No drugs, alcohol, or tobacco.    VITALS:  BP (!) 154/90   Pulse (!) 122   Temp 98.2  F (36.8  C)   Resp 18   SpO2 98%     PHYSICAL EXAM    Vital Signs:  BP (!) 154/90   Pulse (!) 122   Temp 98.2  F (36.8  C)   Resp 18   SpO2 98%   General:  On entering the room he is in no apparent distress.    Neck:  Neck supple with full range of motion and nontender.    Back:  Back and spine are nontender.  No costovertebral angle tenderness.    HEENT:  Oropharynx clear with moist mucous membranes.  HEENT unremarkable.    Pulmonary:  Chest clear to auscultation without rhonchi rales or wheezing.    Cardiovascular:  Cardiac regular rate and rhythm without murmurs rubs or gallops.    Abdomen:  Abdomen soft nontender.  There is no rebound or guarding.    Muskuloskeletal:  He moves all 4 without any difficulty and has normal neurovascular exams.  Extremities without clubbing, cyanosis, or edema.  Legs and calves are nontender.    Neuro:  He is alert and oriented ×3 and moves all extremities symmetrically.    Psych:  Normal affect.    Skin:  Unremarkable and warm and dry.  Lateral right buttocks towards the hip with a 1/2 x 1 inch hard and indurated area.  Tender, slightly warm, but no fluctuance at this time.       LAB:  All pertinent labs reviewed and interpreted.  Labs Ordered and Resulted from Time of ED Arrival to Time of ED Departure - No data to display    RADIOLOGY:  Reviewed all pertinent imaging. Please see official  radiology report.  No orders to display                  Phillip Whitlock M.D.  Emergency Medicine  [facility] EMERGENCY DEPARTMENT  [facility] [facility] [address] [phone]  Dept: [phone]       Phillip Whitlock MD  08/09/23 1949

## 2023-08-10 NOTE — DISCHARGE INSTRUCTIONS
Soak in a bathtub with soap and water 10 to 15 minutes daily.  Tylenol or ibuprofen every 6 hours as needed can help with pain.  Augmentin twice daily for 10 days.  You must see your clinic or doctor within the week.  They can refer you to a general surgeon for a deeper excision.

## 2023-08-11 ENCOUNTER — OFFICE VISIT (OUTPATIENT)
Dept: FAMILY MEDICINE | Facility: CLINIC | Age: 43
End: 2023-08-11
Payer: COMMERCIAL

## 2023-08-11 VITALS
SYSTOLIC BLOOD PRESSURE: 141 MMHG | BODY MASS INDEX: 27.06 KG/M2 | OXYGEN SATURATION: 97 % | HEART RATE: 86 BPM | RESPIRATION RATE: 18 BRPM | WEIGHT: 162.6 LBS | DIASTOLIC BLOOD PRESSURE: 84 MMHG | TEMPERATURE: 98 F

## 2023-08-11 DIAGNOSIS — L02.31 ABSCESS OF RIGHT BUTTOCK: Primary | ICD-10-CM

## 2023-08-11 PROCEDURE — 99213 OFFICE O/P EST LOW 20 MIN: CPT | Performed by: PHYSICIAN ASSISTANT

## 2023-08-11 NOTE — PROGRESS NOTES
Assessment & Plan          1. Abscess of right buttock    -Patient to follow-up with General Surgery.  I have reordered his medication.  Patient advised to start taking the antibiotic as indicated.  - Adult General Surg Referral; Future  - amoxicillin-clavulanate (AUGMENTIN) 875-125 MG tablet; Take 1 tablet by mouth 2 times daily for 10 days  Dispense: 20 tablet; Refill: 0    Patient Instructions   Follow-up with General Surgery for evaluation and surgical treatment    Return for Follow up, with PCP.    At the end of the encounter, I discussed results, diagnosis, medications. Discussed red flags for immediate return to clinic/ER, as well as indications for follow up if no improvement. Patient understood and agreed to plan. Patient was stable for discharge.    Dimitri Gordon is a 43 year old male who presents to clinic today  for the following health issues:  Chief Complaint   Patient presents with    Derm Problem     Cyst on right hip area      HPI  Patient reports abscess on the right hip area.  Patient was seen in the emergency room for the same problem 2 days ago.  I&D was not performed.  Patient has had recurrences of the abscess in the same area for the past 15 years.  Patient was prescribed Augmentin to use twice a day for 10 days.  Patient reports he has not started taking the medication.  He reports he was given a paper prescription which is at home.  He was advised to follow-up with General surgery for surgical excision.  Patient is in the clinic today because the abscess is draining.  He also wants to be referred to General Surgeon.  He notes his primary care provider does not have an appointment available.  Denies fever and chills.      Review of Systems   All other systems reviewed and are negative.      Problem List:  2018-11: Essential hypertension  2018-11: Kidney stone  2017-03: Hypogonadism in male  2017-03: Erectile dysfunction associated with type 2 diabetes   mellitus (H)  2017-03: Fatty  liver  2015-11: Abnormal MRI, lumbar spine  Obesity  Vitamin D Deficiency  Type 2 diabetes mellitus without complication (H)      Past Medical History:   Diagnosis Date    Diabetes (H)     Hypertension        Social History     Tobacco Use    Smoking status: Never    Smokeless tobacco: Never   Substance Use Topics    Alcohol use: No           Objective    BP (!) 141/84 (BP Location: Right arm, Patient Position: Sitting, Cuff Size: Adult Regular)   Pulse 86   Temp 98  F (36.7  C) (Oral)   Resp 18   Wt 73.8 kg (162 lb 9.6 oz)   SpO2 97%   BMI 27.06 kg/m    Physical Exam  Constitutional:       Appearance: Normal appearance.   HENT:      Head: Normocephalic.   Cardiovascular:      Rate and Rhythm: Normal rate and regular rhythm.   Pulmonary:      Effort: Pulmonary effort is normal.      Breath sounds: Normal breath sounds.   Musculoskeletal:      Cervical back: Normal range of motion and neck supple.   Lymphadenopathy:      Head:      Right side of head: No submental, submandibular or tonsillar adenopathy.      Left side of head: No submental, submandibular or tonsillar adenopathy.   Skin:     General: Skin is warm and dry.      Findings: Abscess and erythema present.             Comments: Draining abscess on the right lateral hip, 2 inches in diameter  There is induration and erythema   Neurological:      Mental Status: He is alert and oriented to person, place, and time.              Basilia Funez PA-C

## 2023-08-21 ENCOUNTER — TELEPHONE (OUTPATIENT)
Dept: PHARMACY | Facility: CLINIC | Age: 43
End: 2023-08-21
Payer: COMMERCIAL

## 2023-08-21 NOTE — TELEPHONE ENCOUNTER
This patient is due for [facility] follow-up. I called the patient to schedule an appointment. Appointment scheduled for 9/7/2023 at 3:30PM.      Marie Menjivar, PharmD  Medication Therapy Management Pharmacist   [facility]

## 2023-08-25 ENCOUNTER — OFFICE VISIT (OUTPATIENT)
Dept: SURGERY | Facility: CLINIC | Age: 43
End: 2023-08-25
Attending: PHYSICIAN ASSISTANT
Payer: COMMERCIAL

## 2023-08-25 VITALS
WEIGHT: 164.9 LBS | SYSTOLIC BLOOD PRESSURE: 112 MMHG | BODY MASS INDEX: 27.47 KG/M2 | HEIGHT: 65 IN | DIASTOLIC BLOOD PRESSURE: 74 MMHG

## 2023-08-25 DIAGNOSIS — L02.31 ABSCESS OF RIGHT BUTTOCK: ICD-10-CM

## 2023-08-25 PROCEDURE — 99242 OFF/OP CONSLTJ NEW/EST SF 20: CPT | Performed by: SURGERY

## 2023-08-25 NOTE — PROGRESS NOTES
HPI: Heidi Boyce is a 43 year old male referred to see me by Krishna Jasso for a recurrent abscess on his right buttock.  He notes this has not occurred in the past 10 years until 3 weeks ago, prior to that he had 3 infections here in the span of 5 years.   He was seen in the emergency department several weeks ago and started on antibiotic therapy as it was acting infected.  It drained spontaneously on its own following that emergency department visit and is currently resolving.    He notes during the last 10-year interval, there was a small nodule at the site roughly the size of a pebble    Allergies:Patient has no known allergies.    Prior Medical History:   Past Medical History:   Diagnosis Date    Abnormal MRI, lumbar spine 11/12/2015    Diabetes (H)     Erectile dysfunction associated with type 2 diabetes mellitus (H) 03/06/2017    Essential hypertension 11/01/2018    Fatty liver 03/06/2017    Hypertension     Hypogonadism in male 03/08/2017    Overview:   Poor libido and erectile dysfunction. LH 5.6, Testosterone 205, Prolactin   normal. Pituitary MRI normal.       Kidney stone 11/01/2018    Obesity     Created by Conversion       Type 2 diabetes mellitus without complication (H)     Created by Conversion       Vitamin D Deficiency     Created by Conversion  Replacement Utility updated for latest IMO load          Prior Surgical History: No past surgical history on file.    CURRENT MEDS:  Prior to Admission medications    Medication Sig Start Date End Date Taking? Authorizing Provider   blood glucose (ACCU-CHEK GUIDE) test strip Use to test blood sugar one times daily or as directed. Patient has AccuChek Guide monitor 12/1/22  Yes Krishna Jasso MD   blood glucose (NO BRAND SPECIFIED) test strip Use to test blood sugar 1 time daily or as directed. 12/22/22  Yes Krishna Jasso MD   blood glucose monitoring (SOFTCLIX) lancets Use to test blood sugar one times daily or as directed. Patient has AccuChek  "Softclix device 12/1/22  Yes Krishna Jasso MD   lisinopril (ZESTRIL) 30 MG tablet TAKE 1 TABLET(30 MG) BY MOUTH DAILY 7/27/23  Yes Krishna Jasso MD   metFORMIN (GLUCOPHAGE XR) 500 MG 24 hr tablet TAKE 2 TABLETS(1000 MG) BY MOUTH DAILY 2/13/23  Yes Krishna Jasso MD   dulaglutide (TRULICITY) 0.75 MG/0.5ML pen Inject 0.75 mg Subcutaneous every 7 days  Patient not taking: Reported on 8/25/2023 12/1/22   Krishna Jasso MD         Family History   Problem Relation Age of Onset    Coronary Artery Disease Father         reports that he has never smoked. He has never used smokeless tobacco. He reports that he does not drink alcohol and does not use drugs.    History   Smoking Status    Never   Smokeless Tobacco    Never       Review of Systems -    The 10 point review of systems is within normal limits except as noted in HPI.    /74 (BP Location: Right arm, Patient Position: Sitting)   Ht 1.651 m (5' 5\")   Wt 74.8 kg (164 lb 14.4 oz)   BMI 27.44 kg/m    164 lbs 14.4 oz  Body mass index is 27.44 kg/m .    EXAM:  GENERAL: Alert, cooperative, appears stated age  SKIN: Area of superficial this combination present overlying the healing abscess site.  No erythema present.  No fluctuance appreciable.      LABS:  Lab Results   Component Value Date    HGB 14.7 06/14/2022    WBC 10.7 06/14/2022     06/14/2022    AST 14 06/14/2022    ALT 15 06/14/2022    ALKPHOS 88 06/14/2022    BILITOTAL 0.5 06/14/2022           Assessment/Plan:   1. Abscess of right buttock          Heidi Boyce is a 43 year old male with signs and symptoms consistent with a recurrent abscess, likely with underlying cystic component given the presence of a firm mass when not actively infected.    I think we should be able to excise this in clinic under local anesthetic, I would give it a few more weeks of healing from his current state for doing so.  Also discussed the importance of getting his diabetes under better control as the last " visible hemoglobin A1c was over 12.    He understands everything which was discussed and has consented to proceed with a excision under local anesthetic in clinic at least 4 weeks out from now, but otherwise at a time of convenience for him.      10 minutes spent on the date of the encounter doing chart review, patient visit, and documentation    Efren Mcnair MD ,MD  [facility] Department of Surgery

## 2023-08-25 NOTE — LETTER
8/25/2023         RE: Heidi Boyce  [address]  [place] [address]        Dear Colleague,    Thank you for referring your patient, Heidi Boyce, to the [facility] [place]. Please see a copy of my visit note below.    HPI: Heidi Boyce is a 43 year old male referred to see me by Krishna Jasso for a recurrent abscess on his right buttock.  He notes this has not occurred in the past 10 years until 3 weeks ago, prior to that he had 3 infections here in the span of 5 years.   He was seen in the emergency department several weeks ago and started on antibiotic therapy as it was acting infected.  It drained spontaneously on its own following that emergency department visit and is currently resolving.    He notes during the last 10-year interval, there was a small nodule at the site roughly the size of a pebble    Allergies:Patient has no known allergies.    Prior Medical History:   Past Medical History:   Diagnosis Date     Abnormal MRI, lumbar spine 11/12/2015     Diabetes (H)      Erectile dysfunction associated with type 2 diabetes mellitus (H) 03/06/2017     Essential hypertension 11/01/2018     Fatty liver 03/06/2017     Hypertension      Hypogonadism in male 03/08/2017    Overview:   Poor libido and erectile dysfunction. LH 5.6, Testosterone 205, Prolactin   normal. Pituitary MRI normal.        Kidney stone 11/01/2018     Obesity     Created by Conversion        Type 2 diabetes mellitus without complication (H)     Created by Conversion        Vitamin D Deficiency     Created by Conversion  Replacement Utility updated for latest IMO load          Prior Surgical History: No past surgical history on file.    CURRENT MEDS:  Prior to Admission medications    Medication Sig Start Date End Date Taking? Authorizing Provider   blood glucose (ACCU-CHEK GUIDE) test strip Use to test blood sugar one times daily or as directed. Patient has AccuChek Guide monitor  "12/1/22  Yes Krishna Jasso MD   blood glucose (NO BRAND SPECIFIED) test strip Use to test blood sugar 1 time daily or as directed. 12/22/22  Yes Krishna Jasso MD   blood glucose monitoring (SOFTCLIX) lancets Use to test blood sugar one times daily or as directed. Patient has AccuChek Softclix device 12/1/22  Yes Krishna Jasso MD   lisinopril (ZESTRIL) 30 MG tablet TAKE 1 TABLET(30 MG) BY MOUTH DAILY 7/27/23  Yes Krishna Jasso MD   metFORMIN (GLUCOPHAGE XR) 500 MG 24 hr tablet TAKE 2 TABLETS(1000 MG) BY MOUTH DAILY 2/13/23  Yes Krishna Jasso MD   dulaglutide (TRULICITY) 0.75 MG/0.5ML pen Inject 0.75 mg Subcutaneous every 7 days  Patient not taking: Reported on 8/25/2023 12/1/22   Krishna Jasso MD         Family History   Problem Relation Age of Onset     Coronary Artery Disease Father         reports that he has never smoked. He has never used smokeless tobacco. He reports that he does not drink alcohol and does not use drugs.    History   Smoking Status     Never   Smokeless Tobacco     Never       Review of Systems -    The 10 point review of systems is within normal limits except as noted in HPI.    /74 (BP Location: Right arm, Patient Position: Sitting)   Ht 1.651 m (5' 5\")   Wt 74.8 kg (164 lb 14.4 oz)   BMI 27.44 kg/m    164 lbs 14.4 oz  Body mass index is 27.44 kg/m .    EXAM:  GENERAL: Alert, cooperative, appears stated age  SKIN: Area of superficial this combination present overlying the healing abscess site.  No erythema present.  No fluctuance appreciable.      LABS:  Lab Results   Component Value Date    HGB 14.7 06/14/2022    WBC 10.7 06/14/2022     06/14/2022    AST 14 06/14/2022    ALT 15 06/14/2022    ALKPHOS 88 06/14/2022    BILITOTAL 0.5 06/14/2022           Assessment/Plan:   1. Abscess of right buttock          Heidi Boyce is a 43 year old male with signs and symptoms consistent with a recurrent abscess, likely with underlying cystic component given the presence of " a firm mass when not actively infected.    I think we should be able to excise this in clinic under local anesthetic, I would give it a few more weeks of healing from his current state for doing so.  Also discussed the importance of getting his diabetes under better control as the last visible hemoglobin A1c was over 12.    He understands everything which was discussed and has consented to proceed with a excision under local anesthetic in clinic at least 4 weeks out from now, but otherwise at a time of convenience for him.      10 minutes spent on the date of the encounter doing chart review, patient visit, and documentation    Efren Mcnair MD ,MD  [facility] Department of Surgery      Again, thank you for allowing me to participate in the care of your patient.        Sincerely,        Efren Mcnair MD

## 2023-09-21 ENCOUNTER — LAB (OUTPATIENT)
Dept: LAB | Facility: CLINIC | Age: 43
End: 2023-09-21
Payer: COMMERCIAL

## 2023-09-21 ENCOUNTER — OFFICE VISIT (OUTPATIENT)
Dept: PHARMACY | Facility: CLINIC | Age: 43
End: 2023-09-21
Payer: COMMERCIAL

## 2023-09-21 VITALS
DIASTOLIC BLOOD PRESSURE: 85 MMHG | SYSTOLIC BLOOD PRESSURE: 127 MMHG | WEIGHT: 163.8 LBS | BODY MASS INDEX: 27.26 KG/M2 | HEART RATE: 92 BPM

## 2023-09-21 DIAGNOSIS — I10 ESSENTIAL HYPERTENSION: ICD-10-CM

## 2023-09-21 DIAGNOSIS — E78.5 HYPERLIPIDEMIA LDL GOAL <70: ICD-10-CM

## 2023-09-21 DIAGNOSIS — E11.9 TYPE 2 DIABETES MELLITUS WITHOUT COMPLICATION, WITHOUT LONG-TERM CURRENT USE OF INSULIN (H): Primary | ICD-10-CM

## 2023-09-21 DIAGNOSIS — E11.9 TYPE 2 DIABETES MELLITUS WITHOUT COMPLICATION, WITHOUT LONG-TERM CURRENT USE OF INSULIN (H): ICD-10-CM

## 2023-09-21 LAB
ALBUMIN SERPL BCG-MCNC: 4.5 G/DL (ref 3.5–5.2)
ALP SERPL-CCNC: 82 U/L (ref 40–129)
ALT SERPL W P-5'-P-CCNC: 24 U/L (ref 0–70)
ANION GAP SERPL CALCULATED.3IONS-SCNC: 13 MMOL/L (ref 7–15)
AST SERPL W P-5'-P-CCNC: 26 U/L (ref 0–45)
BILIRUB SERPL-MCNC: 0.4 MG/DL
BUN SERPL-MCNC: 10.2 MG/DL (ref 6–20)
CALCIUM SERPL-MCNC: 9.5 MG/DL (ref 8.6–10)
CHLORIDE SERPL-SCNC: 100 MMOL/L (ref 98–107)
CHOLEST SERPL-MCNC: 200 MG/DL
CREAT SERPL-MCNC: 0.57 MG/DL (ref 0.67–1.17)
DEPRECATED HCO3 PLAS-SCNC: 23 MMOL/L (ref 22–29)
EGFRCR SERPLBLD CKD-EPI 2021: >90 ML/MIN/1.73M2
GLUCOSE SERPL-MCNC: 254 MG/DL (ref 70–99)
HBA1C MFR BLD: 12.6 % (ref 0–5.6)
HDLC SERPL-MCNC: 42 MG/DL
LDLC SERPL CALC-MCNC: 131 MG/DL
NONHDLC SERPL-MCNC: 158 MG/DL
POTASSIUM SERPL-SCNC: 4.6 MMOL/L (ref 3.4–5.3)
PROT SERPL-MCNC: 7.5 G/DL (ref 6.4–8.3)
SODIUM SERPL-SCNC: 136 MMOL/L (ref 136–145)
TRIGL SERPL-MCNC: 135 MG/DL

## 2023-09-21 PROCEDURE — 36415 COLL VENOUS BLD VENIPUNCTURE: CPT

## 2023-09-21 PROCEDURE — 80061 LIPID PANEL: CPT

## 2023-09-21 PROCEDURE — 80053 COMPREHEN METABOLIC PANEL: CPT

## 2023-09-21 PROCEDURE — 83036 HEMOGLOBIN GLYCOSYLATED A1C: CPT

## 2023-09-21 NOTE — PATIENT INSTRUCTIONS
"Recommendations from today's MTM visit:                                                      Schedule an eye exam for      Labs today: A1c, cholesterol, electrolytes, and kidney function    Start Trulicity 0.75mg once weekly     Start checking your blood sugars once daily, you can either check in the morning before eating or 2 hours after a meal. Goal before eatin-130 mg/dL Goal 2 hours after a meal: <180 mg/dL    Make annual appointment with Dr. Jasso     Follow-up: 10/12/2023 at 4:00PM    It was great speaking with you today.  I value your experience and would be very thankful for your time in providing feedback in our clinic survey. In the next few days, you may receive an email or text message from [facility] [employer] with a link to a survey related to your  clinical pharmacist.\"     To schedule another MTM appointment, please call the clinic directly or you may call the MTM scheduling line at [phone] or toll-free at [phone].     My Clinical Pharmacist's contact information:                                                      Please feel free to contact me with any questions or concerns you have.      Marie Menjivar, PharmD  Medication Therapy Management Pharmacist   [facility]    "

## 2023-09-21 NOTE — PROGRESS NOTES
Medication Therapy Management (MTM) Encounter    ASSESSMENT:                            Medication Adherence/Access: No issues identified    Diabetes: Patient's A1c has improved but remains above goal of <7%. Recommend patient re-start Trulicity with daily monitoring of blood sugars to assess efficacy. Recommend patient schedule yearly routine eye exam. Did not discuss today but also have option to consider increasing metformin dose in the future but given greater efficacy for blood sugar reduction would recommend starting Trulicity therapy initially.     Hypertension: Blood pressure at/near goal of <130/80, recommend obtaining updated BMP today.     Hyperlipidemia: Previous LDL elevated above goal of <70, would recommend re-check of levels today to determine if therapy is needed given age and diagnosis of diabetes likely that statin therapy would be indicated.     PLAN:                            Schedule an eye exam for      Labs today: A1c, cholesterol, electrolytes, and kidney function    Start Trulicity 0.75mg once weekly     Start checking your blood sugars once daily, you can either check in the morning before eating or 2 hours after a meal. Goal before eatin-130 mg/dL Goal 2 hours after a meal: <180 mg/dL    Make annual appointment with Dr. Jasso     Follow-up: 10/12/2023 at 4:00PM    SUBJECTIVE/OBJECTIVE:                          Heidi Boyce is a 43 year old male coming in for a follow-up visit from 2022 with Lorrie Pharm D.       Reason for visit: Diabetes follow up.    Allergies/ADRs: Reviewed in chart  Past Medical History: Reviewed in chart  Tobacco: He reports that he has never smoked. He has never used smokeless tobacco.  Alcohol: not currently using    Medication Adherence/Access: Patient takes medications directly from bottles.  Patient takes medications 1 time(s) per day.   Per patient, misses medication 0 times per week.   Medication barriers: none.     Diabetes   Trulicity 0.75mg  weekly- Patient currently not taking. Patient notes that he used this for 1 month only because he ran out of refills he thinks that it might have not been covered, the first month was covered. He notes that he was able to tolerate this medication.   Metformin XR 1000mg daily   Patient is not experiencing side effects.  Blood sugar monitoring: occasionally; Ranges: (patient reported) Fasting- a week ago: 280,  he does not remember any other readings.   Current diabetes symptoms: none, sometimes urinating more especially when drinking coffee.      Eye exam in the last 12 months? No  Foot exam: due  Urine Albumin:   Lab Results   Component Value Date    UMALCR  11/21/2022      Comment:      Unable to calculate, urine albumin and/or urine creatinine is outside detectable limits.  Microalbuminuria is defined as an albumin:creatinine ratio of 17 to 299 for males and 25 to 299 for females. A ratio of albumin:creatinine of 300 or higher is indicative of overt proteinuria.  Due to biologic variability, positive results should be confirmed by a second, first-morning random or 24-hour timed urine specimen. If there is discrepancy, a third specimen is recommended. When 2 out of 3 results are in the microalbuminuria range, this is evidence for incipient nephropathy and warrants increased efforts at glucose control, blood pressure control, and institution of therapy with an angiotensin-converting-enzyme (ACE) inhibitor (if the patient can tolerate it).        Lab Results   Component Value Date    A1C 12.6 (H) 09/21/2023     Hypertension   Lisinopril 30mg daily   Patient reports no current medication side effects.  Patient does not self-monitor blood pressure.       BP Readings from Last 3 Encounters:   08/25/23 112/74   08/11/23 (!) 141/84   08/09/23 130/82     Pulse Readings from Last 3 Encounters:   08/11/23 86   08/09/23 119   12/01/22 105     Hyperlipidemia   no current medications     Recent Labs   Lab Test 11/21/22  0718  04/06/18  0832   CHOL 168 173   HDL 43 36*   * 114   TRIG 121 116     Today's Vitals: /85   Pulse 92   Wt 163 lb 12.8 oz (74.3 kg)   BMI 27.26 kg/m    ----------------  I spent 30 minutes with this patient today. All changes were made via collaborative practice agreement with Krishna Jasso MD. A copy of the visit note was provided to the patient's provider(s).    A summary of these recommendations was given to the patient.    Marie Menjivar, PharmD  Medication Therapy Management Pharmacist   [facility]        Medication Therapy Recommendations  Hyperlipidemia LDL goal <70    Rationale: Medication requires monitoring - Needs additional monitoring   Recommendation: Order Lab   Status: Accepted per CPA         Type 2 diabetes mellitus without complication (H)    Current Medication: metFORMIN (GLUCOPHAGE XR) 500 MG 24 hr tablet   Rationale: Synergistic therapy - Needs additional medication therapy - Indication   Recommendation: Start Medication - Trulicity 0.75 MG/0.5ML Sopn   Status: Accepted per CPA

## 2023-09-22 DIAGNOSIS — E78.5 HYPERLIPIDEMIA LDL GOAL <70: ICD-10-CM

## 2023-09-22 RX ORDER — ROSUVASTATIN CALCIUM 20 MG/1
20 TABLET, COATED ORAL DAILY
Qty: 90 TABLET | OUTPATIENT
Start: 2023-09-22

## 2023-09-22 RX ORDER — ROSUVASTATIN CALCIUM 20 MG/1
20 TABLET, COATED ORAL DAILY
Qty: 60 TABLET | Refills: 1 | Status: SHIPPED | OUTPATIENT
Start: 2023-09-22 | End: 2024-01-16

## 2023-09-26 ENCOUNTER — TELEPHONE (OUTPATIENT)
Dept: FAMILY MEDICINE | Facility: CLINIC | Age: 43
End: 2023-09-26
Payer: COMMERCIAL

## 2023-09-26 NOTE — TELEPHONE ENCOUNTER
PA Initiation     Medication: dulaglutide (TRULICITY) 0.75 MG/0.5ML pen   Insurance Company:  [employer]   Pharmacy Filling the Rx:  [facility]  Filling Pharmacy Phone:  [phone]  Filling Pharmacy Fax:  [phone]  Start Date:  09/26/2023

## 2023-09-27 NOTE — TELEPHONE ENCOUNTER
Central Prior Authorization Team   Phone: [phone]    PA Initiation    Medication: dulaglutide (TRULICITY) 0.75 MG/0.5ML pen  Insurance Company: [employer] - Phone [phone] Fax [phone]  Pharmacy Filling the Rx: [id number] [place], [place] - [address] AT [facility] [address]  Filling Pharmacy Phone: [phone]  Filling Pharmacy Fax:    Start Date: 9/27/2023

## 2023-09-28 NOTE — TELEPHONE ENCOUNTER
Prior Authorization Approval    Authorization Effective Date: 9/27/2023  Authorization Expiration Date: 9/27/2024  Medication: dulaglutide (TRULICITY) 0.75 MG/0.5ML pen  Reference #:     Insurance Company: [employer] - Phone [phone] Fax [phone]  Which Pharmacy is filling the prescription (Not needed for infusion/clinic administered): [id number] [place], [place] - [facility] AT [facility] [address]  Pharmacy Notified: Yes  Patient Notified: Instructed pharmacy to notify patient when script is ready to /ship.

## 2023-10-12 ENCOUNTER — VIRTUAL VISIT (OUTPATIENT)
Dept: PHARMACY | Facility: CLINIC | Age: 43
End: 2023-10-12
Payer: COMMERCIAL

## 2023-10-12 DIAGNOSIS — I10 ESSENTIAL HYPERTENSION: ICD-10-CM

## 2023-10-12 DIAGNOSIS — E78.5 HYPERLIPIDEMIA LDL GOAL <70: ICD-10-CM

## 2023-10-12 DIAGNOSIS — E11.9 TYPE 2 DIABETES MELLITUS WITHOUT COMPLICATION, WITHOUT LONG-TERM CURRENT USE OF INSULIN (H): Primary | ICD-10-CM

## 2023-10-12 PROCEDURE — 99606 MTMS BY PHARM EST 15 MIN: CPT

## 2023-10-12 RX ORDER — METFORMIN HCL 500 MG
TABLET, EXTENDED RELEASE 24 HR ORAL
Qty: 360 TABLET | Refills: 1 | Status: SHIPPED | OUTPATIENT
Start: 2023-10-12 | End: 2024-01-24 | Stop reason: DRUGHIGH

## 2023-10-12 RX ORDER — LISINOPRIL 30 MG/1
30 TABLET ORAL DAILY
Qty: 90 TABLET | Refills: 1 | Status: SHIPPED | OUTPATIENT
Start: 2023-10-12 | End: 2024-01-24

## 2023-10-12 NOTE — PROGRESS NOTES
Medication Therapy Management (MTM) Encounter    ASSESSMENT:                            Medication Adherence/Access: See below for considerations    Diabetes: Would benefit from a dose increase of Trulicity after completing 4 doses at the 0.75mg dose given that blood sugars are still above goal. Additionally would benefit form a dose increase of metformin to optimize current therapy and help with blood sugar reduction (no dose reduction needed with current kidney function). A 90 day supply of each medication was sent to the patient's pharmacy at the updated doses.     Hyperlipidemia: Stable, would benefit from re-checking cholesterol labs in 4-12 weeks after starting rosuvastatin.     PLAN:                            Increase Trulicity to 1.5mg weekly after finishing 0.75mg dose     Increase metformin to 2 tablets in the AM and 1 tablet in the PM for 7 days then 2 tablets twice daily     90 day supplies of current medications were sent to the pharmacy     Follow-up: Return in about 1 month (around 2023).    SUBJECTIVE/OBJECTIVE:                          Heidi Boyce is a 43 year old male called for a follow-up visit from 2023.       Reason for visit: Diabetes follow up.    Allergies/ADRs: Reviewed in chart  Past Medical History: Reviewed in chart    Medication Adherence/Access: Patient notes that the Trulicity was covered with no concerns after the prior authorization was approved. Patient notes that his insurance coverage will stop sometime soon because his wife's will be leaving her job thus requests a 3 month supply of current medications to fill before insurance ends.     Diabetes   Trulicity 0.75mg weekly (started at last MTM visit)- patient notes that he has taken 2 doses for 2 weeks. Denies side effects.   Metformin XR 1000mg daily   Patient is not experiencing side effects.  Blood sugar monitoring: occasionally; Ranges: (patient reported) Fasting- this mornin, another time was 180, 280  another time after eating food.   Current diabetes symptoms: patient notes that since starting Trulicity he has been urinating less.      Eye exam in the last 12 months? No  Foot exam: due  Urine Albumin:   Lab Results   Component Value Date    UMALCR  11/21/2022      Comment:      Unable to calculate, urine albumin and/or urine creatinine is outside detectable limits.  Microalbuminuria is defined as an albumin:creatinine ratio of 17 to 299 for males and 25 to 299 for females. A ratio of albumin:creatinine of 300 or higher is indicative of overt proteinuria.  Due to biologic variability, positive results should be confirmed by a second, first-morning random or 24-hour timed urine specimen. If there is discrepancy, a third specimen is recommended. When 2 out of 3 results are in the microalbuminuria range, this is evidence for incipient nephropathy and warrants increased efforts at glucose control, blood pressure control, and institution of therapy with an angiotensin-converting-enzyme (ACE) inhibitor (if the patient can tolerate it).        Lab Results   Component Value Date    A1C 12.6 (H) 09/21/2023     GFR Estimate   Date Value Ref Range Status   09/21/2023 >90 >60 mL/min/1.73m2 Final   06/14/2022 >90 >60 mL/min/1.73m2 Final     Comment:     Effective December 21, 2021 eGFRcr in adults is calculated using the 2021 CKD-EPI creatinine equation which includes age and gender (Olesya et al., NEJM, DOI: 10.1056/TANCrk2792268)   10/25/2021 >90 >60 mL/min/1.73m2 Final     Comment:     As of July 11, 2021, eGFR is calculated by the CKD-EPI creatinine equation, without race adjustment. eGFR can be influenced by muscle mass, exercise, and diet. The reported eGFR is an estimation only and is only applicable if the renal function is stable.   11/01/2018 >60 >60 mL/min/1.73m2 Final   04/06/2018 >60 >60 mL/min/1.73m2 Final   03/30/2010 >90 >60 mL/min/1.7m2 Final     Hyperlipidemia   rosuvastatin 20mg daily (started at last MTM  visit)   Patient reports no current medication side effects.     Recent Labs   Lab Test 09/21/23  1602 11/21/22  0718   CHOL 200* 168   HDL 42 43   * 101*   TRIG 135 121     Today's Vitals: There were no vitals taken for this visit.  ----------------  I spent 12 minutes with this patient today. All changes were made via collaborative practice agreement with Krishna Jasso MD. A copy of the visit note was provided to the patient's provider(s).    A summary of these recommendations was declined by the patient.    Marie Menjivar, PharmD  Medication Therapy Management Pharmacist   [facility]     Telemedicine Visit Details  Type of service:  Telephone visit  Start Time:  3:59 PM  End Time: 4:11 PM       Medication Therapy Recommendations  Type 2 diabetes mellitus without complication (H)    Current Medication: dulaglutide (TRULICITY) 0.75 MG/0.5ML pen (Discontinued)   Rationale: Dose too low - Dosage too low - Effectiveness   Recommendation: Increase Dose   Status: Accepted per CPA          Current Medication: metFORMIN (GLUCOPHAGE XR) 500 MG 24 hr tablet   Rationale: Dose too low - Dosage too low - Effectiveness   Recommendation: Increase Dose   Status: Accepted per CPA

## 2023-10-13 NOTE — PATIENT INSTRUCTIONS
"Recommendations from today's MTM visit:                                                      Increase Trulicity to 1.5mg weekly after finishing 0.75mg dose     Increase metformin to 2 tablets in the AM and 1 tablet in the PM for 7 days then 2 tablets twice daily     90 day supplies of current medications were sent to the pharmacy     Follow-up: Return in about 1 month (around 11/12/2023).    It was great speaking with you today.  I value your experience and would be very thankful for your time in providing feedback in our clinic survey. In the next few days, you may receive an email or text message from [facility] with a link to a survey related to your  clinical pharmacist.\"     To schedule another MTM appointment, please call the clinic directly or you may call the MTM scheduling line at [phone] or toll-free at [phone].     My Clinical Pharmacist's contact information:                                                      Please feel free to contact me with any questions or concerns you have.      Marie Menjivar, PharmD  Medication Therapy Management Pharmacist   [facility]    "

## 2023-11-28 ENCOUNTER — TELEPHONE (OUTPATIENT)
Dept: PHARMACY | Facility: CLINIC | Age: 43
End: 2023-11-28
Payer: COMMERCIAL

## 2023-11-28 NOTE — TELEPHONE ENCOUNTER
Called patient to check in on medication changes, patient notes that things are going well and declines side effects. Will call next month to schedule appointment to obtain updated A1c.     Marie Menjivar, PharmD  Medication Therapy Management Pharmacist   [facility]

## 2023-11-30 DIAGNOSIS — E78.5 HYPERLIPIDEMIA LDL GOAL <70: ICD-10-CM

## 2023-11-30 RX ORDER — ROSUVASTATIN CALCIUM 20 MG/1
20 TABLET, COATED ORAL DAILY
Qty: 60 TABLET | Refills: 1 | OUTPATIENT
Start: 2023-11-30

## 2023-12-27 ENCOUNTER — TELEPHONE (OUTPATIENT)
Dept: PHARMACY | Facility: CLINIC | Age: 43
End: 2023-12-27
Payer: COMMERCIAL

## 2023-12-27 DIAGNOSIS — E11.9 TYPE 2 DIABETES MELLITUS WITHOUT COMPLICATION, WITHOUT LONG-TERM CURRENT USE OF INSULIN (H): Primary | ICD-10-CM

## 2023-12-27 NOTE — TELEPHONE ENCOUNTER
Patient is due for an updated A1c, called but was not able to connect with patient. Left message with clinic to call and schedule either lab only visit or MTM visit which ever is most convenient to obtain.     Order for A1c has been placed.     Marie Menjivar, PharmD  Medication Therapy Management Pharmacist   [facility]

## 2024-01-15 DIAGNOSIS — E78.5 HYPERLIPIDEMIA LDL GOAL <70: ICD-10-CM

## 2024-01-16 DIAGNOSIS — E78.5 HYPERLIPIDEMIA LDL GOAL <70: ICD-10-CM

## 2024-01-16 RX ORDER — ROSUVASTATIN CALCIUM 20 MG/1
20 TABLET, COATED ORAL DAILY
Qty: 90 TABLET | OUTPATIENT
Start: 2024-01-16

## 2024-01-16 RX ORDER — ROSUVASTATIN CALCIUM 20 MG/1
20 TABLET, COATED ORAL DAILY
Qty: 30 TABLET | Refills: 0 | Status: SHIPPED | OUTPATIENT
Start: 2024-01-16 | End: 2024-01-24

## 2024-01-20 DIAGNOSIS — I10 ESSENTIAL HYPERTENSION: ICD-10-CM

## 2024-01-20 DIAGNOSIS — E11.9 TYPE 2 DIABETES MELLITUS WITHOUT COMPLICATION, WITHOUT LONG-TERM CURRENT USE OF INSULIN (H): ICD-10-CM

## 2024-01-22 RX ORDER — LISINOPRIL 30 MG/1
30 TABLET ORAL DAILY
Qty: 90 TABLET | Refills: 1 | OUTPATIENT
Start: 2024-01-22

## 2024-01-22 RX ORDER — METFORMIN HCL 500 MG
TABLET, EXTENDED RELEASE 24 HR ORAL
Qty: 360 TABLET | Refills: 1 | OUTPATIENT
Start: 2024-01-22

## 2024-01-24 ENCOUNTER — TELEPHONE (OUTPATIENT)
Dept: PHARMACY | Facility: CLINIC | Age: 44
End: 2024-01-24
Payer: COMMERCIAL

## 2024-01-24 DIAGNOSIS — I10 ESSENTIAL HYPERTENSION: ICD-10-CM

## 2024-01-24 DIAGNOSIS — E11.9 TYPE 2 DIABETES MELLITUS WITHOUT COMPLICATION, WITHOUT LONG-TERM CURRENT USE OF INSULIN (H): Primary | ICD-10-CM

## 2024-01-24 DIAGNOSIS — E78.5 HYPERLIPIDEMIA LDL GOAL <70: ICD-10-CM

## 2024-01-24 RX ORDER — ROSUVASTATIN CALCIUM 20 MG/1
20 TABLET, COATED ORAL DAILY
Qty: 90 TABLET | Refills: 0 | Status: SHIPPED | OUTPATIENT
Start: 2024-01-24

## 2024-01-24 RX ORDER — LISINOPRIL 30 MG/1
30 TABLET ORAL DAILY
Qty: 90 TABLET | Refills: 0 | Status: SHIPPED | OUTPATIENT
Start: 2024-01-24

## 2024-01-24 RX ORDER — DULAGLUTIDE 3 MG/.5ML
3 INJECTION, SOLUTION SUBCUTANEOUS WEEKLY
Qty: 6 ML | Refills: 0 | Status: SHIPPED | OUTPATIENT
Start: 2024-01-24

## 2024-01-24 RX ORDER — METFORMIN HCL 500 MG
1000 TABLET, EXTENDED RELEASE 24 HR ORAL 2 TIMES DAILY WITH MEALS
Qty: 360 TABLET | Refills: 0 | Status: SHIPPED | OUTPATIENT
Start: 2024-01-24

## 2024-01-24 NOTE — TELEPHONE ENCOUNTER
Called patient to check in on medication status, patient notes that he has been able to take medications with no concerns and continues to have insurance at this time through wife's employer.  Notes that wife may plan to get a new job thus unsure of insurance status going forward but at this time continues to have insurance.  Refills of medications have been sent for 3-month supplies to allow patient to have medication and a until able to figure out new insurance coverage.     Notes that he continues to take Trulicity 1.5 mg weekly with his last dose being last Friday and metformin at max dose of 2 tablets (1,000mg) twice daily. Notes that he has been tolerating these medications with no concerns of side effects including upset stomach, diarrhea, nausea, and constipation.  Has been monitoring blood sugars, notes that the range that he normally sees is 200-250 in the morning before eating.  Thus do to continued elevated blood sugars, recommended dose increase of Trulicity.     Plan:   Increase Trulicity 3mg weekly   Schedule yearly exam with Dr. Jasso now to obtain updated A1c    MTM follow up: after Dr. Jasso visit with Dr. Jasso.     Marie Menjivar, PharmD  Medication Therapy Management Pharmacist   [facility]

## 2024-03-04 ENCOUNTER — TELEPHONE (OUTPATIENT)
Dept: PHARMACY | Facility: CLINIC | Age: 44
End: 2024-03-04
Payer: COMMERCIAL

## 2024-03-04 NOTE — TELEPHONE ENCOUNTER
This patient is due for PCP follow-up. I called the patient to schedule an appointment and left a message with the clinic phone number for the patient to call to schedule.      Marie Menjivar, PharmD  Medication Therapy Management Pharmacist   [facility]

## 2024-04-16 ENCOUNTER — TELEPHONE (OUTPATIENT)
Dept: PHARMACY | Facility: CLINIC | Age: 44
End: 2024-04-16
Payer: COMMERCIAL

## 2024-04-16 NOTE — TELEPHONE ENCOUNTER
This patient is due for PCP follow-up. I called the patient to schedule an appointment, patient notes that they are trying to figure out their health insurance at the moment thus will call back to schedule an appointment once they are able to do so.      Marie Menjivar, PharmD  Medication Therapy Management Pharmacist   [facility]

## 2024-04-18 NOTE — PROGRESS NOTES
Assessment:     1. Hypertension  sildenafil, antihypertensive, (REVATIO) 20 mg tablet   2. ED (erectile dysfunction)         Plan:     1. Hypertension  Patient's blood pressure has lysed nicely on lisinopril 20 mg he therefore is now a candidate for generic Viagra 2-3 tablets before the event; I will see him for follow-up 3 months  - sildenafil, antihypertensive, (REVATIO) 20 mg tablet; Take 1 tablet (20 mg total) by mouth 3 (three) times a day.  Dispense: 60 tablet; Refill: 0    2. ED (erectile dysfunction)  Generic Viagra 2-3 tablets per event      Subjective:   Patient is being seen for follow-up of his blood pressure after his physical examination found to be hypertensive started on Zestril 20 milligrams daily.  Patient's headaches have decreased in quantity and quality.  Overall he is happy with the therapy.  The patient is accompanied by his wife today.  They are interested in trying to get pregnant again they have a college aged son was emancipated from the house and a 4-year-old at home who keeps bothering him by saying he wants another brother.  Apparently the patient's wife has blocked fallopian tubes and is a patient of Dr. Shani Erickson.  I suggested that they consider a referral to [facility] or [facility] for possible corrective surgery and see if it is covered by their insurance.  Other methods of conception are too expensive for the patient's.  We had a lengthy discussion about this matter and I am trying to help gain some direction.  Patient's wife is on board with this type of in-house referral and then tertiary referral.  Today I will start the patient on some generic Viagra 2-3 tablets 1 hour prior to the event.  This may allay some of the patient's anxiety about his ED problem.  We will follow him up in 3 months for his A1c and see how he is doing on his medicine.    Review of Systems: A complete 14 point review of systems was obtained and is negative or as stated in the history of  "present illness.    Past Medical History:   Diagnosis Date     Diabetes mellitus      Family History   Problem Relation Age of Onset     Heart attack Father      No past surgical history on file.  Social History   Substance Use Topics     Smoking status: Never Smoker     Smokeless tobacco: Never Used     Alcohol use No         Objective:   /84  Pulse 76  Ht 5' 5\" (1.651 m)  Wt 184 lb (83.5 kg)  SpO2 98%  BMI 30.62 kg/m2    General Appearance:  Alert, cooperative, no distress  Head:  Normocephalic, no obvious abnormality  Ears: TM anatomy normal  Eyes:  PERRL, EOM's intact, conjunctiva and corneas clear  Nose:  Nares symmetrical, septum midline, mucosa pink, no sinus tenderness  Throat:  Lips, tongue, and mucosa are moist, pink, and intact  Neck:  Supple, symmetrical, trachea midline, no adenopathy; thyroid: no enlargement, symmetric,no tenderness/mass/nodules; no carotid bruit, no JVD  Back:  Symmetrical, no curvature, ROM normal, no CVA tenderness  Chest/Breast:  No mass or tenderness  Lungs:  Clear to auscultation bilaterally, respirations unlabored   Heart:  Normal PMI, regular rate & rhythm, S1 and S2 normal, no murmurs, rubs, or gallops  Abdomen:  Soft, non-tender, bowel sounds active all four quadrants, no mass, or organomegaly  Musculoskeletal:  Tone and strength strong and symmetrical, all extremities  Lymphatic:  No adenopathy  Skin/Hair/Nails:  Skin warm, dry, and intact, no rashes  Neurologic:  Alert and oriented x3, no cranial nerve deficits, normal strength and tone, gait steady  Extremities:  No edema.  Abiel's sign negative.    Genitourinary: deferred  Pulses:  Equal bilaterally     The following high BMI interventions were performed this visit: encouragement to exercise      This note has been dictated using voice recognition software. Any grammatical or context distortions are unintentional and inherent to the the software.   " n/a

## 2024-07-08 ENCOUNTER — TELEPHONE (OUTPATIENT)
Dept: PHARMACY | Facility: CLINIC | Age: 44
End: 2024-07-08
Payer: COMMERCIAL

## 2024-07-08 NOTE — TELEPHONE ENCOUNTER
This patient is due for PCP follow-up appointment. I called the patient to schedule an appointment but could not reach patient so had to leave voice mail.     Marie Menjivar, PharmD  Medication Therapy Management Pharmacist   [facility]          no

## 2024-08-22 ENCOUNTER — TELEPHONE (OUTPATIENT)
Dept: PHARMACY | Facility: CLINIC | Age: 44
End: 2024-08-22
Payer: COMMERCIAL

## 2024-08-22 NOTE — TELEPHONE ENCOUNTER
We have been unable to reach this patient for MTM follow-up after several attempts. We will stop reaching out to the patient at this time. Please let us know if we can assist in this patient's care in the future.    Routing to PCP as TRACI Menjivar, PharmD  Endocrine & Diabetes Medication Therapy Management Pharmacist   [address] [place] [address]

## 2024-10-17 DIAGNOSIS — I10 ESSENTIAL HYPERTENSION: ICD-10-CM

## 2024-10-17 DIAGNOSIS — E11.9 TYPE 2 DIABETES MELLITUS WITHOUT COMPLICATION, WITHOUT LONG-TERM CURRENT USE OF INSULIN (H): ICD-10-CM

## 2024-10-17 DIAGNOSIS — E78.5 HYPERLIPIDEMIA LDL GOAL <70: ICD-10-CM

## 2024-10-18 DIAGNOSIS — E11.9 TYPE 2 DIABETES MELLITUS WITHOUT COMPLICATION, WITHOUT LONG-TERM CURRENT USE OF INSULIN (H): ICD-10-CM

## 2024-10-18 RX ORDER — ROSUVASTATIN CALCIUM 20 MG/1
20 TABLET, COATED ORAL DAILY
Qty: 90 TABLET | Refills: 0 | Status: SHIPPED | OUTPATIENT
Start: 2024-10-18 | End: 2024-10-31

## 2024-10-18 RX ORDER — LISINOPRIL 30 MG/1
30 TABLET ORAL DAILY
Qty: 90 TABLET | Refills: 0 | Status: SHIPPED | OUTPATIENT
Start: 2024-10-18 | End: 2024-10-31

## 2024-10-18 RX ORDER — METFORMIN HYDROCHLORIDE 500 MG/1
1000 TABLET, EXTENDED RELEASE ORAL 2 TIMES DAILY WITH MEALS
Qty: 360 TABLET | Refills: 0 | Status: SHIPPED | OUTPATIENT
Start: 2024-10-18 | End: 2024-10-31

## 2024-10-18 NOTE — TELEPHONE ENCOUNTER
Patient stated he has not had a gap in his medications and is asking for refills, patient was informed that he has not been seen by provider since Nobember of 2022 and may need to be seen before further refills can be approved, patient stated he lost his insurance and will schedule an appointment as soon as it is back. Adriana is willing to try to pay out of pocket for the medications if provider approves them.     Patient is also requesting a refill of his trulicity but it would not allow writer to reorder this, provider will need to order.     Axel Wall RN  [facility]

## 2024-10-21 RX ORDER — DULAGLUTIDE 3 MG/.5ML
INJECTION, SOLUTION SUBCUTANEOUS
Qty: 6 ML | Refills: 2 | Status: SHIPPED | OUTPATIENT
Start: 2024-10-21

## 2024-10-31 ENCOUNTER — OFFICE VISIT (OUTPATIENT)
Dept: FAMILY MEDICINE | Facility: CLINIC | Age: 44
End: 2024-10-31
Payer: COMMERCIAL

## 2024-10-31 VITALS
SYSTOLIC BLOOD PRESSURE: 128 MMHG | HEIGHT: 65 IN | WEIGHT: 170 LBS | HEART RATE: 80 BPM | OXYGEN SATURATION: 99 % | TEMPERATURE: 98.6 F | DIASTOLIC BLOOD PRESSURE: 76 MMHG | BODY MASS INDEX: 28.32 KG/M2 | RESPIRATION RATE: 18 BRPM

## 2024-10-31 DIAGNOSIS — E11.69 ERECTILE DYSFUNCTION ASSOCIATED WITH TYPE 2 DIABETES MELLITUS (H): Primary | ICD-10-CM

## 2024-10-31 DIAGNOSIS — E11.9 TYPE 2 DIABETES MELLITUS WITHOUT COMPLICATION, WITHOUT LONG-TERM CURRENT USE OF INSULIN (H): ICD-10-CM

## 2024-10-31 DIAGNOSIS — E78.5 HYPERLIPIDEMIA LDL GOAL <70: ICD-10-CM

## 2024-10-31 DIAGNOSIS — I10 ESSENTIAL HYPERTENSION: ICD-10-CM

## 2024-10-31 DIAGNOSIS — Z00.00 ANNUAL PHYSICAL EXAM: ICD-10-CM

## 2024-10-31 DIAGNOSIS — N52.1 ERECTILE DYSFUNCTION ASSOCIATED WITH TYPE 2 DIABETES MELLITUS (H): Primary | ICD-10-CM

## 2024-10-31 PROBLEM — E11.65 TYPE 2 DIABETES MELLITUS WITH HYPERGLYCEMIA (H): Status: ACTIVE | Noted: 2017-03-06

## 2024-10-31 LAB
ALBUMIN UR-MCNC: NEGATIVE MG/DL
APPEARANCE UR: CLEAR
BILIRUB UR QL STRIP: NEGATIVE
COLOR UR AUTO: YELLOW
ERYTHROCYTE [DISTWIDTH] IN BLOOD BY AUTOMATED COUNT: 12.5 % (ref 10–15)
EST. AVERAGE GLUCOSE BLD GHB EST-MCNC: 303 MG/DL
GLUCOSE UR STRIP-MCNC: >=1000 MG/DL
HBA1C MFR BLD: 12.2 % (ref 0–5.6)
HCT VFR BLD AUTO: 41.4 % (ref 40–53)
HGB BLD-MCNC: 14 G/DL (ref 13.3–17.7)
HGB UR QL STRIP: NEGATIVE
KETONES UR STRIP-MCNC: NEGATIVE MG/DL
LEUKOCYTE ESTERASE UR QL STRIP: NEGATIVE
MCH RBC QN AUTO: 27.4 PG (ref 26.5–33)
MCHC RBC AUTO-ENTMCNC: 33.8 G/DL (ref 31.5–36.5)
MCV RBC AUTO: 81 FL (ref 78–100)
NITRATE UR QL: NEGATIVE
PH UR STRIP: 5 [PH] (ref 5–8)
PLATELET # BLD AUTO: 302 10E3/UL (ref 150–450)
RBC # BLD AUTO: 5.11 10E6/UL (ref 4.4–5.9)
SP GR UR STRIP: 1.02 (ref 1–1.03)
UROBILINOGEN UR STRIP-ACNC: 0.2 E.U./DL
WBC # BLD AUTO: 8 10E3/UL (ref 4–11)

## 2024-10-31 PROCEDURE — 80061 LIPID PANEL: CPT | Performed by: FAMILY MEDICINE

## 2024-10-31 PROCEDURE — 84270 ASSAY OF SEX HORMONE GLOBUL: CPT | Performed by: FAMILY MEDICINE

## 2024-10-31 PROCEDURE — 85027 COMPLETE CBC AUTOMATED: CPT | Performed by: FAMILY MEDICINE

## 2024-10-31 PROCEDURE — 84403 ASSAY OF TOTAL TESTOSTERONE: CPT | Performed by: FAMILY MEDICINE

## 2024-10-31 PROCEDURE — 83036 HEMOGLOBIN GLYCOSYLATED A1C: CPT | Performed by: FAMILY MEDICINE

## 2024-10-31 PROCEDURE — 82043 UR ALBUMIN QUANTITATIVE: CPT | Performed by: FAMILY MEDICINE

## 2024-10-31 PROCEDURE — 80053 COMPREHEN METABOLIC PANEL: CPT | Performed by: FAMILY MEDICINE

## 2024-10-31 PROCEDURE — 82570 ASSAY OF URINE CREATININE: CPT | Performed by: FAMILY MEDICINE

## 2024-10-31 PROCEDURE — 81003 URINALYSIS AUTO W/O SCOPE: CPT | Performed by: FAMILY MEDICINE

## 2024-10-31 PROCEDURE — 99396 PREV VISIT EST AGE 40-64: CPT | Mod: 25 | Performed by: FAMILY MEDICINE

## 2024-10-31 PROCEDURE — G0103 PSA SCREENING: HCPCS | Performed by: FAMILY MEDICINE

## 2024-10-31 PROCEDURE — 99214 OFFICE O/P EST MOD 30 MIN: CPT | Mod: 25 | Performed by: FAMILY MEDICINE

## 2024-10-31 PROCEDURE — 36415 COLL VENOUS BLD VENIPUNCTURE: CPT | Performed by: FAMILY MEDICINE

## 2024-10-31 RX ORDER — METFORMIN HYDROCHLORIDE 500 MG/1
1000 TABLET, EXTENDED RELEASE ORAL 2 TIMES DAILY WITH MEALS
Qty: 360 TABLET | Refills: 3 | Status: SHIPPED | OUTPATIENT
Start: 2024-10-31

## 2024-10-31 RX ORDER — LISINOPRIL 30 MG/1
30 TABLET ORAL DAILY
Qty: 90 TABLET | Refills: 3 | Status: SHIPPED | OUTPATIENT
Start: 2024-10-31

## 2024-10-31 RX ORDER — ROSUVASTATIN CALCIUM 20 MG/1
20 TABLET, COATED ORAL DAILY
Qty: 90 TABLET | Refills: 3 | Status: SHIPPED | OUTPATIENT
Start: 2024-10-31

## 2024-10-31 SDOH — HEALTH STABILITY: PHYSICAL HEALTH: ON AVERAGE, HOW MANY MINUTES DO YOU ENGAGE IN EXERCISE AT THIS LEVEL?: 150+ MIN

## 2024-10-31 SDOH — HEALTH STABILITY: PHYSICAL HEALTH: ON AVERAGE, HOW MANY DAYS PER WEEK DO YOU ENGAGE IN MODERATE TO STRENUOUS EXERCISE (LIKE A BRISK WALK)?: 7 DAYS

## 2024-10-31 ASSESSMENT — PAIN SCALES - GENERAL: PAINLEVEL_OUTOF10: MODERATE PAIN (4)

## 2024-10-31 ASSESSMENT — SOCIAL DETERMINANTS OF HEALTH (SDOH): HOW OFTEN DO YOU GET TOGETHER WITH FRIENDS OR RELATIVES?: TWICE A WEEK

## 2024-10-31 NOTE — PROGRESS NOTES
Preventive Care Visit  [facility]  Krishna Jasso MD, Family Medicine  Oct 31, 2024      Assessment & Plan     Essential hypertension    - lisinopril (ZESTRIL) 30 MG tablet; Take 1 tablet (30 mg) by mouth daily.  - CBC with platelets; Future  - Comprehensive metabolic panel; Future  - Lipid panel reflex to direct LDL Fasting; Future  - Prostate Specific Antigen Screen; Future  - HEMOGLOBIN A1C; Future  - UA Macroscopic with reflex to Microscopic and Culture; Future  - Adult Diabetes Education  Referral; Future  - Testosterone Free and Total; Future  - CBC with platelets  - Prostate Specific Antigen Screen  - Testosterone Free and Total    Type 2 diabetes mellitus without complication, without long-term current use of insulin (H)    - metFORMIN (GLUCOPHAGE XR) 500 MG 24 hr tablet; Take 2 tablets (1,000 mg) by mouth 2 times daily (with meals).  - Comprehensive metabolic panel (BMP + Alb, Alk Phos, ALT, AST, Total. Bili, TP); Future  - Lipid panel reflex to direct LDL Fasting; Future  - Hemoglobin A1c; Future  - UA Macroscopic with reflex to Microscopic and Culture - Lab Collect; Future  - CBC with platelets; Future  - Comprehensive metabolic panel; Future  - Lipid panel reflex to direct LDL Fasting; Future  - HEMOGLOBIN A1C; Future  - Comprehensive metabolic panel (BMP + Alb, Alk Phos, ALT, AST, Total. Bili, TP)  - Lipid panel reflex to direct LDL Fasting  - Hemoglobin A1c  - UA Macroscopic with reflex to Microscopic and Culture - Lab Collect  - CBC with platelets    Hyperlipidemia LDL goal <70    - rosuvastatin (CRESTOR) 20 MG tablet; Take 1 tablet (20 mg) by mouth daily.  - Lipid panel reflex to direct LDL Fasting; Future    Erectile dysfunction associated with type 2 diabetes mellitus (H)    - Albumin Random Urine Quantitative with Creat Ratio; Future  - Lipid panel reflex to direct LDL Fasting; Future  - HEMOGLOBIN A1C; Future  - UA Macroscopic with reflex to Microscopic and  "Culture; Future  - Albumin Random Urine Quantitative with Creat Ratio    Patient has been advised of split billing requirements and indicates understanding: Yes        BMI  Estimated body mass index is 28.29 kg/m  as calculated from the following:    Height as of this encounter: 1.651 m (5' 5\").    Weight as of this encounter: 77.1 kg (170 lb).   Weight management plan: Discussed healthy diet and exercise guidelines    Counseling  Appropriate preventive services were addressed with this patient via screening, questionnaire, or discussion as appropriate for fall prevention, nutrition, physical activity, Tobacco-use cessation, social engagement, weight loss and cognition.  Checklist reviewing preventive services available has been given to the patient.  Reviewed patient's diet, addressing concerns and/or questions.   The patient was instructed to see the dentist every 6 months.           Dimitri Gordon is a 44 year old, presenting for the following: The patient comes in for an annual physical exam and update on his medications.  He is concerned about erectile dysfunction.  We have addressed this problem before.  He has tried Viagra but it has not worked.  He has not been evaluated for his type 2 diabetes he needs refill of his GLP-1 injection.  He has not seen diabetes education and over 3 years.  He does not check his blood sugars on a regular basis needs really a new evaluation he does have dyslipidemia and comorbid factors of benign essential hypertension.  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  His weight is stable he is not experiencing any shortness of breath or dyspnea no peripheral neuropathy symptomatology.  Plan here is to do testosterone George evaluations cholesterol A1c kidney function evaluation return in 2 weeks to recheck status of blood test results testosterone cholesterol etc. and to " discuss erectile dysfunction once again.  He agrees with this approach  Recheck Medication, Physical, Diabetes, and Hypertension           Do you have a current opioid prescription? no  Do you use any other controlled substances or medications that are not prescribed by a provider?  no        Health Care Directive  Patient does not have a Health Care Directive: Discussed advance care planning with patient; however, patient declined at this time.      10/31/2024   General Health   How would you rate your overall physical health? Good   Feel stress (tense, anxious, or unable to sleep) Not at all            10/31/2024   Nutrition   Three or more servings of calcium each day? Yes   Diet: Diabetic   How many servings of fruit and vegetables per day? (!) 2-3   How many sweetened beverages each day? (!) I DON'T KNOW            10/31/2024   Exercise   Days per week of moderate/strenous exercise 7 days   Average minutes spent exercising at this level 150+ min            10/31/2024   Social Factors   Frequency of gathering with friends or relatives Twice a week   Worry food won't last until get money to buy more No   Food not last or not have enough money for food? No   Do you have housing? (Housing is defined as stable permanent housing and does not include staying ouside in a car, in a tent, in an abandoned building, in an overnight shelter, or couch-surfing.) No   Are you worried about losing your housing? No   Lack of transportation? No   Unable to get utilities (heat,electricity)? No   Want help with housing or utility concern? No      (!) HOUSING CONCERN PRESENT      10/31/2024   Dental   Dentist two times every year? (!) NO               Today's PHQ-2 Score:       10/31/2024     7:27 AM   PHQ-2 ( 1999 [employer])   Q1: Little interest or pleasure in doing things 0    Q2: Feeling down, depressed or hopeless 0    PHQ-2 Score 0    Q1: Little interest or pleasure in doing things Not at all   Q2: Feeling down, depressed or  hopeless Not at all   PHQ-2 Score 0       Patient-reported           10/31/2024   Substance Use   Alcohol more than 3/day or more than 7/wk Not Applicable   Do you use any other substances recreationally? No        Social History     Tobacco Use    Smoking status: Never    Smokeless tobacco: Never   Vaping Use    Vaping status: Never Used   Substance Use Topics    Alcohol use: No    Drug use: No           10/31/2024   STI Screening   New sexual partner(s) since last STI/HIV test? No      ASCVD Risk   The 10-year ASCVD risk score (Laura KNIGHT, et al., 2019) is: 11.9%    Values used to calculate the score:      Age: 44 years      Sex: Male      Is Non- : Yes      Diabetic: Yes      Tobacco smoker: No      Systolic Blood Pressure: 128 mmHg      Is BP treated: Yes      HDL Cholesterol: 42 mg/dL      Total Cholesterol: 200 mg/dL        10/31/2024   Contraception/Family Planning   Questions about contraception or family planning No           Reviewed and updated as needed this visit by Provider                          Review of Systems  CONSTITUTIONAL: NEGATIVE for fever, chills, change in weight  INTEGUMENTARY/SKIN: NEGATIVE for worrisome rashes, moles or lesions  EYES: NEGATIVE for vision changes or irritation  ENT/MOUTH: NEGATIVE for ear, mouth and throat problems  RESP: NEGATIVE for significant cough or SOB  BREAST: NEGATIVE for masses, tenderness or discharge  CV: NEGATIVE for chest pain, palpitations or peripheral edema  GI: NEGATIVE for nausea, abdominal pain, heartburn, or change in bowel habits  : NEGATIVE for frequency, dysuria, or hematuria  MUSCULOSKELETAL: NEGATIVE for significant arthralgias or myalgia  NEURO: NEGATIVE for weakness, dizziness or paresthesias  ENDOCRINE: NEGATIVE for temperature intolerance, skin/hair changes  HEME: NEGATIVE for bleeding problems  PSYCHIATRIC: NEGATIVE for changes in mood or affect     Objective    Exam  /76   Pulse 80   Temp 98.6  F  "(37  C)   Resp 18   Ht 1.651 m (5' 5\")   Wt 77.1 kg (170 lb)   SpO2 99%   BMI 28.29 kg/m     Estimated body mass index is 28.29 kg/m  as calculated from the following:    Height as of this encounter: 1.651 m (5' 5\").    Weight as of this encounter: 77.1 kg (170 lb).    Physical Exam  GENERAL: alert and no distress  EYES: Eyes grossly normal to inspection, PERRL and conjunctivae and sclerae normal  HENT: ear canals and TM's normal, nose and mouth without ulcers or lesions  NECK: no adenopathy, no asymmetry, masses, or scars  RESP: lungs clear to auscultation - no rales, rhonchi or wheezes  CV: regular rate and rhythm, normal S1 S2, no S3 or S4, no murmur, click or rub, no peripheral edema  ABDOMEN: soft, nontender, no hepatosplenomegaly, no masses and bowel sounds normal  MS: no gross musculoskeletal defects noted, no edema  SKIN: no suspicious lesions or rashes  NEURO: Normal strength and tone, mentation intact and speech normal  PSYCH: mentation appears normal, affect normal/bright    Prostate normal    Signed Electronically by: Krishna Jasso MD    "

## 2024-11-01 LAB
ALBUMIN SERPL BCG-MCNC: 4.3 G/DL (ref 3.5–5.2)
ALP SERPL-CCNC: 84 U/L (ref 40–150)
ALT SERPL W P-5'-P-CCNC: 19 U/L (ref 0–70)
ANION GAP SERPL CALCULATED.3IONS-SCNC: 11 MMOL/L (ref 7–15)
AST SERPL W P-5'-P-CCNC: 23 U/L (ref 0–45)
BILIRUB SERPL-MCNC: 0.2 MG/DL
BUN SERPL-MCNC: 11.7 MG/DL (ref 6–20)
CALCIUM SERPL-MCNC: 9.4 MG/DL (ref 8.8–10.4)
CHLORIDE SERPL-SCNC: 101 MMOL/L (ref 98–107)
CHOLEST SERPL-MCNC: 100 MG/DL
CREAT SERPL-MCNC: 0.51 MG/DL (ref 0.67–1.17)
CREAT UR-MCNC: 93.2 MG/DL
EGFRCR SERPLBLD CKD-EPI 2021: >90 ML/MIN/1.73M2
FASTING STATUS PATIENT QL REPORTED: ABNORMAL
FASTING STATUS PATIENT QL REPORTED: ABNORMAL
GLUCOSE SERPL-MCNC: 305 MG/DL (ref 70–99)
HCO3 SERPL-SCNC: 24 MMOL/L (ref 22–29)
HDLC SERPL-MCNC: 35 MG/DL
LDLC SERPL CALC-MCNC: 43 MG/DL
MICROALBUMIN UR-MCNC: <12 MG/L
MICROALBUMIN/CREAT UR: NORMAL MG/G{CREAT}
NONHDLC SERPL-MCNC: 65 MG/DL
POTASSIUM SERPL-SCNC: 4.7 MMOL/L (ref 3.4–5.3)
PROT SERPL-MCNC: 7.2 G/DL (ref 6.4–8.3)
PSA SERPL DL<=0.01 NG/ML-MCNC: 0.37 NG/ML (ref 0–2.5)
SHBG SERPL-SCNC: 13 NMOL/L (ref 11–80)
SODIUM SERPL-SCNC: 136 MMOL/L (ref 135–145)
TRIGL SERPL-MCNC: 112 MG/DL

## 2024-11-02 LAB
TESTOST FREE SERPL-MCNC: 6.71 NG/DL
TESTOST SERPL-MCNC: 228 NG/DL (ref 240–950)

## 2024-11-04 ENCOUNTER — TELEPHONE (OUTPATIENT)
Dept: FAMILY MEDICINE | Facility: CLINIC | Age: 44
End: 2024-11-04
Payer: COMMERCIAL

## 2024-11-13 ENCOUNTER — TRANSFERRED RECORDS (OUTPATIENT)
Dept: MULTI SPECIALTY CLINIC | Facility: CLINIC | Age: 44
End: 2024-11-13
Payer: COMMERCIAL

## 2024-11-13 LAB — RETINOPATHY: NORMAL

## 2024-11-14 ENCOUNTER — OFFICE VISIT (OUTPATIENT)
Dept: FAMILY MEDICINE | Facility: CLINIC | Age: 44
End: 2024-11-14
Payer: COMMERCIAL

## 2024-11-14 VITALS
HEIGHT: 65 IN | WEIGHT: 168 LBS | HEART RATE: 81 BPM | OXYGEN SATURATION: 99 % | SYSTOLIC BLOOD PRESSURE: 132 MMHG | RESPIRATION RATE: 18 BRPM | DIASTOLIC BLOOD PRESSURE: 76 MMHG | TEMPERATURE: 98.6 F | BODY MASS INDEX: 27.99 KG/M2

## 2024-11-14 DIAGNOSIS — E78.5 HYPERLIPIDEMIA LDL GOAL <70: ICD-10-CM

## 2024-11-14 DIAGNOSIS — E11.9 TYPE 2 DIABETES MELLITUS WITHOUT COMPLICATION, WITHOUT LONG-TERM CURRENT USE OF INSULIN (H): ICD-10-CM

## 2024-11-14 DIAGNOSIS — I10 ESSENTIAL HYPERTENSION: ICD-10-CM

## 2024-11-14 PROCEDURE — 99213 OFFICE O/P EST LOW 20 MIN: CPT | Performed by: FAMILY MEDICINE

## 2024-11-14 RX ORDER — LISINOPRIL 30 MG/1
30 TABLET ORAL DAILY
Qty: 90 TABLET | Refills: 3 | Status: SHIPPED | OUTPATIENT
Start: 2024-11-14

## 2024-11-14 RX ORDER — ROSUVASTATIN CALCIUM 20 MG/1
20 TABLET, COATED ORAL DAILY
Qty: 90 TABLET | Refills: 3 | Status: SHIPPED | OUTPATIENT
Start: 2024-11-14

## 2024-11-14 RX ORDER — METFORMIN HYDROCHLORIDE 500 MG/1
1000 TABLET, EXTENDED RELEASE ORAL 2 TIMES DAILY WITH MEALS
Qty: 360 TABLET | Refills: 3 | Status: SHIPPED | OUTPATIENT
Start: 2024-11-14

## 2024-11-14 RX ORDER — DULAGLUTIDE 3 MG/.5ML
3 INJECTION, SOLUTION SUBCUTANEOUS WEEKLY
Qty: 6 ML | Refills: 2 | Status: SHIPPED | OUTPATIENT
Start: 2024-11-14

## 2024-11-14 ASSESSMENT — PAIN SCALES - GENERAL: PAINLEVEL_OUTOF10: NO PAIN (0)

## 2024-11-14 NOTE — LETTER
November 14, 2024      Heidi ROSE Austen  [address]  [place] [address]      Dear ,    Recent Results (from the past 720 hours)   Comprehensive metabolic panel (BMP + Alb, Alk Phos, ALT, AST, Total. Bili, TP)    Collection Time: 10/31/24  8:34 AM   Result Value Ref Range    Sodium 136 135 - 145 mmol/L    Potassium 4.7 3.4 - 5.3 mmol/L    Carbon Dioxide (CO2) 24 22 - 29 mmol/L    Anion Gap 11 7 - 15 mmol/L    Urea Nitrogen 11.7 6.0 - 20.0 mg/dL    Creatinine 0.51 (L) 0.67 - 1.17 mg/dL    GFR Estimate >90 >60 mL/min/1.73m2    Calcium 9.4 8.8 - 10.4 mg/dL    Chloride 101 98 - 107 mmol/L    Glucose 305 (H) 70 - 99 mg/dL    Alkaline Phosphatase 84 40 - 150 U/L    AST 23 0 - 45 U/L    ALT 19 0 - 70 U/L    Protein Total 7.2 6.4 - 8.3 g/dL    Albumin 4.3 3.5 - 5.2 g/dL    Bilirubin Total 0.2 <=1.2 mg/dL    Patient Fasting > 8hrs? Unknown    Lipid panel reflex to direct LDL Fasting    Collection Time: 10/31/24  8:34 AM   Result Value Ref Range    Cholesterol 100 <200 mg/dL    Triglycerides 112 <150 mg/dL    Direct Measure HDL 35 (L) >=40 mg/dL    LDL Cholesterol Calculated 43 <100 mg/dL    Non HDL Cholesterol 65 <130 mg/dL    Patient Fasting > 8hrs? Unknown    Hemoglobin A1c    Collection Time: 10/31/24  8:34 AM   Result Value Ref Range    Estimated Average Glucose 303 (H) <117 mg/dL    Hemoglobin A1C 12.2 (H) 0.0 - 5.6 %   CBC with platelets    Collection Time: 10/31/24  8:34 AM   Result Value Ref Range    WBC Count 8.0 4.0 - 11.0 10e3/uL    RBC Count 5.11 4.40 - 5.90 10e6/uL    Hemoglobin 14.0 13.3 - 17.7 g/dL    Hematocrit 41.4 40.0 - 53.0 %    MCV 81 78 - 100 fL    MCH 27.4 26.5 - 33.0 pg    MCHC 33.8 31.5 - 36.5 g/dL    RDW 12.5 10.0 - 15.0 %    Platelet Count 302 150 - 450 10e3/uL   Prostate Specific Antigen Screen    Collection Time: 10/31/24  8:34 AM   Result Value Ref Range    Prostate Specific Antigen Screen 0.37 0.00 - 2.50 ng/mL   Sex Hormone Binding Globulin    Collection Time: 10/31/24  8:34 AM    Result Value Ref Range    Sex Hormone Binding Globulin 13 11 - 80 nmol/L   Testosterone Free and Total    Collection Time: 10/31/24  8:34 AM   Result Value Ref Range    Free Testosterone Calculated 6.71 ng/dL    Testosterone Total 228 (L) 240 - 950 ng/dL   Albumin Random Urine Quantitative with Creat Ratio    Collection Time: 10/31/24  8:40 AM   Result Value Ref Range    Creatinine Urine mg/dL 93.2 mg/dL    Albumin Urine mg/L <12.0 mg/L    Albumin Urine mg/g Cr     UA Macroscopic with reflex to Microscopic and Culture - Lab Collect    Collection Time: 10/31/24  8:40 AM    Specimen: Urine, Midstream   Result Value Ref Range    Color Urine Yellow Colorless, Straw, Light Yellow, Yellow    Appearance Urine Clear Clear    Glucose Urine >=1000 (A) Negative mg/dL    Bilirubin Urine Negative Negative    Ketones Urine Negative Negative mg/dL    Specific Gravity Urine 1.025 1.005 - 1.030    Blood Urine Negative Negative    pH Urine 5.0 5.0 - 8.0    Protein Albumin Urine Negative Negative mg/dL    Urobilinogen Urine 0.2 0.2, 1.0 E.U./dL    Nitrite Urine Negative Negative    Leukocyte Esterase Urine Negative Negative      Sincerely,    Dr Krishna Jasso

## 2024-11-14 NOTE — PROGRESS NOTES
Assessment & Plan     Essential hypertension    - lisinopril (ZESTRIL) 30 MG tablet; Take 1 tablet (30 mg) by mouth daily.    Type 2 diabetes mellitus without complication, without long-term current use of insulin (H)  Begin Trulicity he was previously but quit at 1 year ago  - metFORMIN (GLUCOPHAGE XR) 500 MG 24 hr tablet; Take 2 tablets (1,000 mg) by mouth 2 times daily (with meals).  - Dulaglutide (TRULICITY) 3 MG/0.5ML SOAJ; Inject 3 mg subcutaneously once a week.    Hyperlipidemia LDL goal <70  Continue the Crestor I will see him for follow-up 3 months  - rosuvastatin (CRESTOR) 20 MG tablet; Take 1 tablet (20 mg) by mouth daily.                Dimitri Gordon is a 44 year old, presenting for the following health issues:  Recheck Medication and Results      Via the Health Maintenance questionnaire, the patient has reported the following services have been completed -Eye Exam: [facility] 2024-11-13, this information has been sent to the abstraction team.  HPI     Seeing the patient again for follow-up of his test results his testosterone is very slightly low no need for supplementation.  His ED is probably due to his A1c being 12.0 with an average blood sugar of 300.  He was on Trulicity a year ago but he stopped it we will reinstitute therapy with that continue the metformin twice daily I want to recheck his A1c in 3 months.  We did discuss his diet he has [place] and we did go over some fine points with that.  It does not appear to be excessively starchy.  We may have to reinstitute a visit with nursing again.  He does work at a restaurant.  I really enjoyed seeing him again  Overall of his tests and a satisfied time spent face-to-face non-face-to-face 26 minutes.          Review of Systems  Constitutional, HEENT, cardiovascular, pulmonary, GI, , musculoskeletal, neuro, skin, endocrine and psych systems are negative, except as otherwise noted.      Objective    /76   Pulse 81   Temp 98.6  F (37  " C) (Oral)   Resp 18   Ht 1.651 m (5' 5\")   Wt 76.2 kg (168 lb)   SpO2 99%   BMI 27.96 kg/m    Body mass index is 27.96 kg/m .  Physical Exam   GENERAL: alert and no distress  NECK: no adenopathy, no asymmetry, masses, or scars  RESP: lungs clear to auscultation - no rales, rhonchi or wheezes  CV: regular rate and rhythm, normal S1 S2, no S3 or S4, no murmur, click or rub, no peripheral edema  ABDOMEN: soft, nontender, no hepatosplenomegaly, no masses and bowel sounds normal  MS: no gross musculoskeletal defects noted, no         Signed Electronically by: Krishna Jasso MD    "